# Patient Record
Sex: FEMALE | Race: WHITE | NOT HISPANIC OR LATINO | Employment: OTHER | URBAN - METROPOLITAN AREA
[De-identification: names, ages, dates, MRNs, and addresses within clinical notes are randomized per-mention and may not be internally consistent; named-entity substitution may affect disease eponyms.]

---

## 2017-04-28 PROBLEM — M79.644 FINGER PAIN, RIGHT: Status: ACTIVE | Noted: 2017-04-28

## 2017-04-28 PROBLEM — M72.0 DUPUYTREN'S CONTRACTURE OF RIGHT HAND: Status: ACTIVE | Noted: 2017-04-28

## 2019-06-19 ENCOUNTER — HOSPITAL ENCOUNTER (OUTPATIENT)
Dept: RADIOLOGY | Facility: MEDICAL CENTER | Age: 74
DRG: 455 | End: 2019-06-19
Attending: NEUROLOGICAL SURGERY
Payer: MEDICARE

## 2019-06-19 DIAGNOSIS — Z01.811 PRE-OPERATIVE RESPIRATORY EXAMINATION: ICD-10-CM

## 2019-06-19 DIAGNOSIS — Z01.812 PRE-OPERATIVE LABORATORY EXAMINATION: ICD-10-CM

## 2019-06-19 DIAGNOSIS — R79.1 ABNORMAL COAGULATION PROFILE: ICD-10-CM

## 2019-06-19 DIAGNOSIS — M43.06 SPONDYLOLYSIS, LUMBAR REGION: ICD-10-CM

## 2019-06-19 DIAGNOSIS — Z01.810 PRE-OPERATIVE CARDIOVASCULAR EXAMINATION: ICD-10-CM

## 2019-06-19 DIAGNOSIS — M54.16 LUMBAR RADICULOPATHY: ICD-10-CM

## 2019-06-19 DIAGNOSIS — R82.90 NONSPECIFIC FINDING ON EXAMINATION OF URINE: ICD-10-CM

## 2019-06-19 DIAGNOSIS — R94.31 NONSPECIFIC ABNORMAL ELECTROCARDIOGRAM (ECG) (EKG): ICD-10-CM

## 2019-06-19 DIAGNOSIS — M48.061 SPINAL STENOSIS OF LUMBAR REGION, UNSPECIFIED WHETHER NEUROGENIC CLAUDICATION PRESENT: ICD-10-CM

## 2019-06-19 LAB
ANION GAP SERPL CALC-SCNC: 8 MMOL/L (ref 0–11.9)
APPEARANCE UR: CLEAR
APTT PPP: 25.3 SEC (ref 24.7–36)
BACTERIA #/AREA URNS HPF: NEGATIVE /HPF
BASOPHILS # BLD AUTO: 0.9 % (ref 0–1.8)
BASOPHILS # BLD: 0.06 K/UL (ref 0–0.12)
BILIRUB UR QL STRIP.AUTO: NEGATIVE
BUN SERPL-MCNC: 13 MG/DL (ref 8–22)
CALCIUM SERPL-MCNC: 9.2 MG/DL (ref 8.5–10.5)
CHLORIDE SERPL-SCNC: 103 MMOL/L (ref 96–112)
CO2 SERPL-SCNC: 30 MMOL/L (ref 20–33)
COLOR UR: YELLOW
CREAT SERPL-MCNC: 0.96 MG/DL (ref 0.5–1.4)
EKG IMPRESSION: NORMAL
EOSINOPHIL # BLD AUTO: 0.18 K/UL (ref 0–0.51)
EOSINOPHIL NFR BLD: 2.7 % (ref 0–6.9)
EPI CELLS #/AREA URNS HPF: NEGATIVE /HPF
ERYTHROCYTE [DISTWIDTH] IN BLOOD BY AUTOMATED COUNT: 43.8 FL (ref 35.9–50)
GLUCOSE SERPL-MCNC: 133 MG/DL (ref 65–99)
GLUCOSE UR STRIP.AUTO-MCNC: NEGATIVE MG/DL
HCT VFR BLD AUTO: 40.9 % (ref 37–47)
HGB BLD-MCNC: 13.3 G/DL (ref 12–16)
HYALINE CASTS #/AREA URNS LPF: ABNORMAL /LPF
IMM GRANULOCYTES # BLD AUTO: 0.06 K/UL (ref 0–0.11)
IMM GRANULOCYTES NFR BLD AUTO: 0.9 % (ref 0–0.9)
INR PPP: 0.91 (ref 0.87–1.13)
KETONES UR STRIP.AUTO-MCNC: NEGATIVE MG/DL
LEUKOCYTE ESTERASE UR QL STRIP.AUTO: ABNORMAL
LYMPHOCYTES # BLD AUTO: 2.21 K/UL (ref 1–4.8)
LYMPHOCYTES NFR BLD: 33.4 % (ref 22–41)
MCH RBC QN AUTO: 30.2 PG (ref 27–33)
MCHC RBC AUTO-ENTMCNC: 32.5 G/DL (ref 33.6–35)
MCV RBC AUTO: 92.7 FL (ref 81.4–97.8)
MICRO URNS: ABNORMAL
MONOCYTES # BLD AUTO: 0.41 K/UL (ref 0–0.85)
MONOCYTES NFR BLD AUTO: 6.2 % (ref 0–13.4)
NEUTROPHILS # BLD AUTO: 3.69 K/UL (ref 2–7.15)
NEUTROPHILS NFR BLD: 55.9 % (ref 44–72)
NITRITE UR QL STRIP.AUTO: NEGATIVE
NRBC # BLD AUTO: 0 K/UL
NRBC BLD-RTO: 0 /100 WBC
PH UR STRIP.AUTO: 6 [PH]
PLATELET # BLD AUTO: 220 K/UL (ref 164–446)
PMV BLD AUTO: 10.9 FL (ref 9–12.9)
POTASSIUM SERPL-SCNC: 4.3 MMOL/L (ref 3.6–5.5)
PROT UR QL STRIP: NEGATIVE MG/DL
PROTHROMBIN TIME: 12.4 SEC (ref 12–14.6)
RBC # BLD AUTO: 4.41 M/UL (ref 4.2–5.4)
RBC # URNS HPF: ABNORMAL /HPF
RBC UR QL AUTO: NEGATIVE
SODIUM SERPL-SCNC: 141 MMOL/L (ref 135–145)
SP GR UR STRIP.AUTO: 1.02
UROBILINOGEN UR STRIP.AUTO-MCNC: 0.2 MG/DL
WBC # BLD AUTO: 6.6 K/UL (ref 4.8–10.8)
WBC #/AREA URNS HPF: ABNORMAL /HPF

## 2019-06-19 PROCEDURE — 85730 THROMBOPLASTIN TIME PARTIAL: CPT

## 2019-06-19 PROCEDURE — 81001 URINALYSIS AUTO W/SCOPE: CPT

## 2019-06-19 PROCEDURE — 85025 COMPLETE CBC W/AUTO DIFF WBC: CPT

## 2019-06-19 PROCEDURE — 36415 COLL VENOUS BLD VENIPUNCTURE: CPT

## 2019-06-19 PROCEDURE — 80048 BASIC METABOLIC PNL TOTAL CA: CPT

## 2019-06-19 PROCEDURE — 72110 X-RAY EXAM L-2 SPINE 4/>VWS: CPT

## 2019-06-19 PROCEDURE — 85610 PROTHROMBIN TIME: CPT

## 2019-06-19 PROCEDURE — 71046 X-RAY EXAM CHEST 2 VIEWS: CPT

## 2019-06-19 PROCEDURE — 93005 ELECTROCARDIOGRAM TRACING: CPT

## 2019-06-19 PROCEDURE — 93010 ELECTROCARDIOGRAM REPORT: CPT | Performed by: INTERNAL MEDICINE

## 2019-06-19 RX ORDER — ATORVASTATIN CALCIUM 40 MG/1
40 TABLET, FILM COATED ORAL NIGHTLY
COMMUNITY

## 2019-07-02 ENCOUNTER — APPOINTMENT (OUTPATIENT)
Dept: RADIOLOGY | Facility: MEDICAL CENTER | Age: 74
DRG: 455 | End: 2019-07-02
Attending: NEUROLOGICAL SURGERY
Payer: MEDICARE

## 2019-07-02 ENCOUNTER — HOSPITAL ENCOUNTER (INPATIENT)
Facility: MEDICAL CENTER | Age: 74
LOS: 8 days | DRG: 455 | End: 2019-07-10
Attending: NEUROLOGICAL SURGERY | Admitting: NEUROLOGICAL SURGERY
Payer: MEDICARE

## 2019-07-02 ENCOUNTER — ANESTHESIA (OUTPATIENT)
Dept: SURGERY | Facility: MEDICAL CENTER | Age: 74
DRG: 455 | End: 2019-07-02
Payer: MEDICARE

## 2019-07-02 ENCOUNTER — ANESTHESIA EVENT (OUTPATIENT)
Dept: SURGERY | Facility: MEDICAL CENTER | Age: 74
DRG: 455 | End: 2019-07-02
Payer: MEDICARE

## 2019-07-02 DIAGNOSIS — Z98.1 S/P LUMBAR FUSION: ICD-10-CM

## 2019-07-02 DIAGNOSIS — R26.9 ABNORMAL GAIT: ICD-10-CM

## 2019-07-02 LAB
GLUCOSE BLD-MCNC: 103 MG/DL (ref 65–99)
GLUCOSE BLD-MCNC: 186 MG/DL (ref 65–99)
GLUCOSE BLD-MCNC: 215 MG/DL (ref 65–99)

## 2019-07-02 PROCEDURE — 700112 HCHG RX REV CODE 229: Performed by: PHYSICIAN ASSISTANT

## 2019-07-02 PROCEDURE — 95925 SOMATOSENSORY TESTING: CPT | Performed by: NEUROLOGICAL SURGERY

## 2019-07-02 PROCEDURE — 160009 HCHG ANES TIME/MIN: Performed by: NEUROLOGICAL SURGERY

## 2019-07-02 PROCEDURE — 700105 HCHG RX REV CODE 258: Performed by: NEUROLOGICAL SURGERY

## 2019-07-02 PROCEDURE — 700111 HCHG RX REV CODE 636 W/ 250 OVERRIDE (IP): Performed by: PHYSICIAN ASSISTANT

## 2019-07-02 PROCEDURE — 95861 NEEDLE EMG 2 EXTREMITIES: CPT | Performed by: NEUROLOGICAL SURGERY

## 2019-07-02 PROCEDURE — 700101 HCHG RX REV CODE 250: Performed by: NEUROLOGICAL SURGERY

## 2019-07-02 PROCEDURE — 160042 HCHG SURGERY MINUTES - EA ADDL 1 MIN LEVEL 5: Performed by: NEUROLOGICAL SURGERY

## 2019-07-02 PROCEDURE — 502240 HCHG MISC OR SUPPLY RC 0272: Performed by: NEUROLOGICAL SURGERY

## 2019-07-02 PROCEDURE — 0ST20ZZ RESECTION OF LUMBAR VERTEBRAL DISC, OPEN APPROACH: ICD-10-PCS | Performed by: NEUROLOGICAL SURGERY

## 2019-07-02 PROCEDURE — 0SG10A0 FUSION OF 2 OR MORE LUMBAR VERTEBRAL JOINTS WITH INTERBODY FUSION DEVICE, ANTERIOR APPROACH, ANTERIOR COLUMN, OPEN APPROACH: ICD-10-PCS | Performed by: NEUROLOGICAL SURGERY

## 2019-07-02 PROCEDURE — 700102 HCHG RX REV CODE 250 W/ 637 OVERRIDE(OP): Performed by: PHYSICIAN ASSISTANT

## 2019-07-02 PROCEDURE — 82962 GLUCOSE BLOOD TEST: CPT

## 2019-07-02 PROCEDURE — 160036 HCHG PACU - EA ADDL 30 MINS PHASE I: Performed by: NEUROLOGICAL SURGERY

## 2019-07-02 PROCEDURE — 160048 HCHG OR STATISTICAL LEVEL 1-5: Performed by: NEUROLOGICAL SURGERY

## 2019-07-02 PROCEDURE — L0637 LSO SC R ANT/POS PNL PRE CST: HCPCS

## 2019-07-02 PROCEDURE — 4A11X4G MONITORING OF PERIPHERAL NERVOUS ELECTRICAL ACTIVITY, INTRAOPERATIVE, EXTERNAL APPROACH: ICD-10-PCS | Performed by: NEUROLOGICAL SURGERY

## 2019-07-02 PROCEDURE — 160002 HCHG RECOVERY MINUTES (STAT): Performed by: NEUROLOGICAL SURGERY

## 2019-07-02 PROCEDURE — 770001 HCHG ROOM/CARE - MED/SURG/GYN PRIV*

## 2019-07-02 PROCEDURE — 502000 HCHG MISC OR IMPLANTS RC 0278: Performed by: NEUROLOGICAL SURGERY

## 2019-07-02 PROCEDURE — 72100 X-RAY EXAM L-S SPINE 2/3 VWS: CPT

## 2019-07-02 PROCEDURE — 501838 HCHG SUTURE GENERAL: Performed by: NEUROLOGICAL SURGERY

## 2019-07-02 PROCEDURE — 95937 NEUROMUSCULAR JUNCTION TEST: CPT | Performed by: NEUROLOGICAL SURGERY

## 2019-07-02 PROCEDURE — 700111 HCHG RX REV CODE 636 W/ 250 OVERRIDE (IP): Performed by: ANESTHESIOLOGY

## 2019-07-02 PROCEDURE — 700101 HCHG RX REV CODE 250: Performed by: ANESTHESIOLOGY

## 2019-07-02 PROCEDURE — A9270 NON-COVERED ITEM OR SERVICE: HCPCS | Performed by: ANESTHESIOLOGY

## 2019-07-02 PROCEDURE — 160035 HCHG PACU - 1ST 60 MINS PHASE I: Performed by: NEUROLOGICAL SURGERY

## 2019-07-02 PROCEDURE — 700106 HCHG RX REV CODE 271: Performed by: NEUROLOGICAL SURGERY

## 2019-07-02 PROCEDURE — 95940 IONM IN OPERATNG ROOM 15 MIN: CPT | Performed by: NEUROLOGICAL SURGERY

## 2019-07-02 PROCEDURE — A9270 NON-COVERED ITEM OR SERVICE: HCPCS | Performed by: PHYSICIAN ASSISTANT

## 2019-07-02 PROCEDURE — A4314 CATH W/DRAINAGE 2-WAY LATEX: HCPCS | Performed by: NEUROLOGICAL SURGERY

## 2019-07-02 PROCEDURE — 700101 HCHG RX REV CODE 250: Performed by: PHYSICIAN ASSISTANT

## 2019-07-02 PROCEDURE — 700101 HCHG RX REV CODE 250

## 2019-07-02 PROCEDURE — 500885 HCHG PACK, JACKSON TABLE: Performed by: NEUROLOGICAL SURGERY

## 2019-07-02 PROCEDURE — 700102 HCHG RX REV CODE 250 W/ 637 OVERRIDE(OP): Performed by: ANESTHESIOLOGY

## 2019-07-02 PROCEDURE — 160031 HCHG SURGERY MINUTES - 1ST 30 MINS LEVEL 5: Performed by: NEUROLOGICAL SURGERY

## 2019-07-02 RX ORDER — ENEMA 19; 7 G/133ML; G/133ML
1 ENEMA RECTAL
Status: DISCONTINUED | OUTPATIENT
Start: 2019-07-02 | End: 2019-07-10 | Stop reason: HOSPADM

## 2019-07-02 RX ORDER — HYDROMORPHONE HYDROCHLORIDE 2 MG/ML
INJECTION, SOLUTION INTRAMUSCULAR; INTRAVENOUS; SUBCUTANEOUS PRN
Status: DISCONTINUED | OUTPATIENT
Start: 2019-07-02 | End: 2019-07-02 | Stop reason: SURG

## 2019-07-02 RX ORDER — OXYCODONE HCL 5 MG/5 ML
5 SOLUTION, ORAL ORAL
Status: COMPLETED | OUTPATIENT
Start: 2019-07-02 | End: 2019-07-02

## 2019-07-02 RX ORDER — OXYCODONE HCL 5 MG/5 ML
10 SOLUTION, ORAL ORAL
Status: COMPLETED | OUTPATIENT
Start: 2019-07-02 | End: 2019-07-02

## 2019-07-02 RX ORDER — METHOCARBAMOL 750 MG/1
750 TABLET, FILM COATED ORAL EVERY 8 HOURS PRN
Status: DISCONTINUED | OUTPATIENT
Start: 2019-07-02 | End: 2019-07-05

## 2019-07-02 RX ORDER — ONDANSETRON 2 MG/ML
4 INJECTION INTRAMUSCULAR; INTRAVENOUS EVERY 4 HOURS PRN
Status: DISCONTINUED | OUTPATIENT
Start: 2019-07-02 | End: 2019-07-10 | Stop reason: HOSPADM

## 2019-07-02 RX ORDER — DULOXETIN HYDROCHLORIDE 60 MG/1
60 CAPSULE, DELAYED RELEASE ORAL 2 TIMES DAILY
Status: DISCONTINUED | OUTPATIENT
Start: 2019-07-02 | End: 2019-07-10 | Stop reason: HOSPADM

## 2019-07-02 RX ORDER — BISACODYL 10 MG
10 SUPPOSITORY, RECTAL RECTAL
Status: DISCONTINUED | OUTPATIENT
Start: 2019-07-02 | End: 2019-07-10 | Stop reason: HOSPADM

## 2019-07-02 RX ORDER — HYDROMORPHONE HYDROCHLORIDE 1 MG/ML
.5-1 INJECTION, SOLUTION INTRAMUSCULAR; INTRAVENOUS; SUBCUTANEOUS
Status: DISCONTINUED | OUTPATIENT
Start: 2019-07-02 | End: 2019-07-10 | Stop reason: HOSPADM

## 2019-07-02 RX ORDER — LABETALOL HYDROCHLORIDE 5 MG/ML
5 INJECTION, SOLUTION INTRAVENOUS
Status: DISCONTINUED | OUTPATIENT
Start: 2019-07-02 | End: 2019-07-02 | Stop reason: HOSPADM

## 2019-07-02 RX ORDER — HYDRALAZINE HYDROCHLORIDE 20 MG/ML
10 INJECTION INTRAMUSCULAR; INTRAVENOUS
Status: DISCONTINUED | OUTPATIENT
Start: 2019-07-02 | End: 2019-07-10 | Stop reason: HOSPADM

## 2019-07-02 RX ORDER — SODIUM CHLORIDE, SODIUM LACTATE, POTASSIUM CHLORIDE, AND CALCIUM CHLORIDE .6; .31; .03; .02 G/100ML; G/100ML; G/100ML; G/100ML
IRRIGANT IRRIGATION
Status: DISCONTINUED | OUTPATIENT
Start: 2019-07-02 | End: 2019-07-02 | Stop reason: HOSPADM

## 2019-07-02 RX ORDER — MIDAZOLAM HYDROCHLORIDE 1 MG/ML
1 INJECTION INTRAMUSCULAR; INTRAVENOUS
Status: DISCONTINUED | OUTPATIENT
Start: 2019-07-02 | End: 2019-07-02 | Stop reason: HOSPADM

## 2019-07-02 RX ORDER — DIPHENHYDRAMINE HYDROCHLORIDE 50 MG/ML
12.5 INJECTION INTRAMUSCULAR; INTRAVENOUS
Status: DISCONTINUED | OUTPATIENT
Start: 2019-07-02 | End: 2019-07-02 | Stop reason: HOSPADM

## 2019-07-02 RX ORDER — LABETALOL HYDROCHLORIDE 5 MG/ML
INJECTION, SOLUTION INTRAVENOUS PRN
Status: DISCONTINUED | OUTPATIENT
Start: 2019-07-02 | End: 2019-07-02 | Stop reason: SURG

## 2019-07-02 RX ORDER — ALPRAZOLAM 0.25 MG/1
0.25 TABLET ORAL 2 TIMES DAILY PRN
Status: DISCONTINUED | OUTPATIENT
Start: 2019-07-02 | End: 2019-07-10 | Stop reason: HOSPADM

## 2019-07-02 RX ORDER — HYDROMORPHONE HYDROCHLORIDE 1 MG/ML
0.4 INJECTION, SOLUTION INTRAMUSCULAR; INTRAVENOUS; SUBCUTANEOUS
Status: DISCONTINUED | OUTPATIENT
Start: 2019-07-02 | End: 2019-07-02 | Stop reason: HOSPADM

## 2019-07-02 RX ORDER — OXYCODONE HYDROCHLORIDE 5 MG/1
5 TABLET ORAL
Status: DISCONTINUED | OUTPATIENT
Start: 2019-07-02 | End: 2019-07-10 | Stop reason: HOSPADM

## 2019-07-02 RX ORDER — ONDANSETRON 2 MG/ML
4 INJECTION INTRAMUSCULAR; INTRAVENOUS
Status: DISCONTINUED | OUTPATIENT
Start: 2019-07-02 | End: 2019-07-02 | Stop reason: HOSPADM

## 2019-07-02 RX ORDER — SODIUM CHLORIDE AND POTASSIUM CHLORIDE 150; 900 MG/100ML; MG/100ML
INJECTION, SOLUTION INTRAVENOUS CONTINUOUS
Status: DISCONTINUED | OUTPATIENT
Start: 2019-07-02 | End: 2019-07-03

## 2019-07-02 RX ORDER — LISINOPRIL 20 MG/1
20 TABLET ORAL EVERY EVENING
Status: DISCONTINUED | OUTPATIENT
Start: 2019-07-02 | End: 2019-07-10 | Stop reason: HOSPADM

## 2019-07-02 RX ORDER — AMOXICILLIN 250 MG
1 CAPSULE ORAL
Status: DISCONTINUED | OUTPATIENT
Start: 2019-07-02 | End: 2019-07-10 | Stop reason: HOSPADM

## 2019-07-02 RX ORDER — MEPERIDINE HYDROCHLORIDE 25 MG/ML
6.25 INJECTION INTRAMUSCULAR; INTRAVENOUS; SUBCUTANEOUS
Status: DISCONTINUED | OUTPATIENT
Start: 2019-07-02 | End: 2019-07-02 | Stop reason: HOSPADM

## 2019-07-02 RX ORDER — CEFAZOLIN SODIUM 1 G/3ML
INJECTION, POWDER, FOR SOLUTION INTRAMUSCULAR; INTRAVENOUS PRN
Status: DISCONTINUED | OUTPATIENT
Start: 2019-07-02 | End: 2019-07-02 | Stop reason: SURG

## 2019-07-02 RX ORDER — GABAPENTIN 300 MG/1
300 CAPSULE ORAL 3 TIMES DAILY
Status: DISCONTINUED | OUTPATIENT
Start: 2019-07-02 | End: 2019-07-10 | Stop reason: HOSPADM

## 2019-07-02 RX ORDER — ACETAMINOPHEN 325 MG/1
650 TABLET ORAL EVERY 6 HOURS
Status: DISPENSED | OUTPATIENT
Start: 2019-07-02 | End: 2019-07-07

## 2019-07-02 RX ORDER — MAGNESIUM SULFATE HEPTAHYDRATE 40 MG/ML
INJECTION, SOLUTION INTRAVENOUS PRN
Status: DISCONTINUED | OUTPATIENT
Start: 2019-07-02 | End: 2019-07-02 | Stop reason: SURG

## 2019-07-02 RX ORDER — OXYCODONE HYDROCHLORIDE 10 MG/1
10 TABLET ORAL
Status: DISCONTINUED | OUTPATIENT
Start: 2019-07-02 | End: 2019-07-10 | Stop reason: HOSPADM

## 2019-07-02 RX ORDER — ACETAMINOPHEN 500 MG
1000 TABLET ORAL ONCE
Status: COMPLETED | OUTPATIENT
Start: 2019-07-02 | End: 2019-07-02

## 2019-07-02 RX ORDER — SODIUM CHLORIDE, SODIUM LACTATE, POTASSIUM CHLORIDE, CALCIUM CHLORIDE 600; 310; 30; 20 MG/100ML; MG/100ML; MG/100ML; MG/100ML
INJECTION, SOLUTION INTRAVENOUS CONTINUOUS
Status: ACTIVE | OUTPATIENT
Start: 2019-07-02 | End: 2019-07-02

## 2019-07-02 RX ORDER — BACITRACIN 50000 [IU]/1
INJECTION, POWDER, FOR SOLUTION INTRAMUSCULAR
Status: DISCONTINUED | OUTPATIENT
Start: 2019-07-02 | End: 2019-07-02 | Stop reason: HOSPADM

## 2019-07-02 RX ORDER — DEXAMETHASONE SODIUM PHOSPHATE 4 MG/ML
INJECTION, SOLUTION INTRA-ARTICULAR; INTRALESIONAL; INTRAMUSCULAR; INTRAVENOUS; SOFT TISSUE PRN
Status: DISCONTINUED | OUTPATIENT
Start: 2019-07-02 | End: 2019-07-02 | Stop reason: SURG

## 2019-07-02 RX ORDER — POLYETHYLENE GLYCOL 3350 17 G/17G
1 POWDER, FOR SOLUTION ORAL 2 TIMES DAILY PRN
Status: DISCONTINUED | OUTPATIENT
Start: 2019-07-02 | End: 2019-07-10 | Stop reason: HOSPADM

## 2019-07-02 RX ORDER — LIDOCAINE HYDROCHLORIDE 10 MG/ML
INJECTION, SOLUTION INFILTRATION; PERINEURAL
Status: COMPLETED
Start: 2019-07-02 | End: 2019-07-02

## 2019-07-02 RX ORDER — DIPHENHYDRAMINE HCL 25 MG
25 TABLET ORAL EVERY 6 HOURS PRN
Status: DISCONTINUED | OUTPATIENT
Start: 2019-07-02 | End: 2019-07-10 | Stop reason: HOSPADM

## 2019-07-02 RX ORDER — ATORVASTATIN CALCIUM 40 MG/1
40 TABLET, FILM COATED ORAL NIGHTLY
Status: DISCONTINUED | OUTPATIENT
Start: 2019-07-02 | End: 2019-07-10 | Stop reason: HOSPADM

## 2019-07-02 RX ORDER — BUPIVACAINE HYDROCHLORIDE AND EPINEPHRINE 5; 5 MG/ML; UG/ML
INJECTION, SOLUTION EPIDURAL; INTRACAUDAL; PERINEURAL
Status: DISCONTINUED | OUTPATIENT
Start: 2019-07-02 | End: 2019-07-02 | Stop reason: HOSPADM

## 2019-07-02 RX ORDER — CELECOXIB 200 MG/1
200 CAPSULE ORAL ONCE
Status: COMPLETED | OUTPATIENT
Start: 2019-07-02 | End: 2019-07-02

## 2019-07-02 RX ORDER — LABETALOL HYDROCHLORIDE 5 MG/ML
INJECTION, SOLUTION INTRAVENOUS
Status: DISCONTINUED | OUTPATIENT
Start: 2019-07-02 | End: 2019-07-02

## 2019-07-02 RX ORDER — DOCUSATE SODIUM 100 MG/1
100 CAPSULE, LIQUID FILLED ORAL 2 TIMES DAILY
Status: DISCONTINUED | OUTPATIENT
Start: 2019-07-02 | End: 2019-07-10 | Stop reason: HOSPADM

## 2019-07-02 RX ORDER — HYDROMORPHONE HYDROCHLORIDE 1 MG/ML
0.2 INJECTION, SOLUTION INTRAMUSCULAR; INTRAVENOUS; SUBCUTANEOUS
Status: DISCONTINUED | OUTPATIENT
Start: 2019-07-02 | End: 2019-07-02 | Stop reason: HOSPADM

## 2019-07-02 RX ORDER — TRAZODONE HYDROCHLORIDE 100 MG/1
100 TABLET ORAL NIGHTLY
Status: DISCONTINUED | OUTPATIENT
Start: 2019-07-02 | End: 2019-07-10 | Stop reason: HOSPADM

## 2019-07-02 RX ORDER — SODIUM CHLORIDE, SODIUM LACTATE, POTASSIUM CHLORIDE, CALCIUM CHLORIDE 600; 310; 30; 20 MG/100ML; MG/100ML; MG/100ML; MG/100ML
INJECTION, SOLUTION INTRAVENOUS CONTINUOUS
Status: DISCONTINUED | OUTPATIENT
Start: 2019-07-02 | End: 2019-07-02 | Stop reason: HOSPADM

## 2019-07-02 RX ORDER — HALOPERIDOL 5 MG/ML
1 INJECTION INTRAMUSCULAR
Status: DISCONTINUED | OUTPATIENT
Start: 2019-07-02 | End: 2019-07-02 | Stop reason: HOSPADM

## 2019-07-02 RX ORDER — HYDROMORPHONE HYDROCHLORIDE 1 MG/ML
0.1 INJECTION, SOLUTION INTRAMUSCULAR; INTRAVENOUS; SUBCUTANEOUS
Status: DISCONTINUED | OUTPATIENT
Start: 2019-07-02 | End: 2019-07-02 | Stop reason: HOSPADM

## 2019-07-02 RX ORDER — HYDRALAZINE HYDROCHLORIDE 20 MG/ML
5 INJECTION INTRAMUSCULAR; INTRAVENOUS
Status: DISCONTINUED | OUTPATIENT
Start: 2019-07-02 | End: 2019-07-02 | Stop reason: HOSPADM

## 2019-07-02 RX ORDER — DIPHENHYDRAMINE HYDROCHLORIDE 50 MG/ML
25 INJECTION INTRAMUSCULAR; INTRAVENOUS EVERY 6 HOURS PRN
Status: DISCONTINUED | OUTPATIENT
Start: 2019-07-02 | End: 2019-07-10 | Stop reason: HOSPADM

## 2019-07-02 RX ORDER — LEVOTHYROXINE SODIUM 0.07 MG/1
75 TABLET ORAL
Status: DISCONTINUED | OUTPATIENT
Start: 2019-07-02 | End: 2019-07-10 | Stop reason: HOSPADM

## 2019-07-02 RX ORDER — LABETALOL HYDROCHLORIDE 5 MG/ML
10 INJECTION, SOLUTION INTRAVENOUS
Status: DISCONTINUED | OUTPATIENT
Start: 2019-07-02 | End: 2019-07-10 | Stop reason: HOSPADM

## 2019-07-02 RX ORDER — CALCIUM CARBONATE 500 MG/1
500 TABLET, CHEWABLE ORAL 2 TIMES DAILY
Status: DISCONTINUED | OUTPATIENT
Start: 2019-07-02 | End: 2019-07-10 | Stop reason: HOSPADM

## 2019-07-02 RX ORDER — AMOXICILLIN 250 MG
1 CAPSULE ORAL NIGHTLY
Status: DISCONTINUED | OUTPATIENT
Start: 2019-07-02 | End: 2019-07-10 | Stop reason: HOSPADM

## 2019-07-02 RX ORDER — ONDANSETRON 4 MG/1
4 TABLET, ORALLY DISINTEGRATING ORAL EVERY 4 HOURS PRN
Status: DISCONTINUED | OUTPATIENT
Start: 2019-07-02 | End: 2019-07-10 | Stop reason: HOSPADM

## 2019-07-02 RX ORDER — ONDANSETRON 2 MG/ML
INJECTION INTRAMUSCULAR; INTRAVENOUS PRN
Status: DISCONTINUED | OUTPATIENT
Start: 2019-07-02 | End: 2019-07-02 | Stop reason: SURG

## 2019-07-02 RX ORDER — CEFAZOLIN SODIUM 2 G/100ML
2 INJECTION, SOLUTION INTRAVENOUS EVERY 8 HOURS
Status: COMPLETED | OUTPATIENT
Start: 2019-07-02 | End: 2019-07-03

## 2019-07-02 RX ORDER — GABAPENTIN 300 MG/1
300 CAPSULE ORAL ONCE
Status: COMPLETED | OUTPATIENT
Start: 2019-07-02 | End: 2019-07-02

## 2019-07-02 RX ORDER — TRAZODONE HYDROCHLORIDE 100 MG/1
100 TABLET ORAL NIGHTLY
COMMUNITY

## 2019-07-02 RX ADMIN — DOCUSATE SODIUM 100 MG: 100 CAPSULE, LIQUID FILLED ORAL at 18:09

## 2019-07-02 RX ADMIN — PROPOFOL 50 MG: 10 INJECTION, EMULSION INTRAVENOUS at 13:48

## 2019-07-02 RX ADMIN — CELECOXIB 200 MG: 200 CAPSULE ORAL at 10:52

## 2019-07-02 RX ADMIN — ONDANSETRON 4 MG: 2 INJECTION INTRAMUSCULAR; INTRAVENOUS at 14:14

## 2019-07-02 RX ADMIN — PROPOFOL 200 MG: 10 INJECTION, EMULSION INTRAVENOUS at 12:07

## 2019-07-02 RX ADMIN — SUFENTANIL CITRATE 50 MCG: 50 INJECTION EPIDURAL; INTRAVENOUS at 12:16

## 2019-07-02 RX ADMIN — ROCURONIUM BROMIDE 20 MG: 10 INJECTION, SOLUTION INTRAVENOUS at 12:16

## 2019-07-02 RX ADMIN — ANTACID TABLETS 500 MG: 500 TABLET, CHEWABLE ORAL at 18:08

## 2019-07-02 RX ADMIN — OXYCODONE HYDROCHLORIDE 10 MG: 10 TABLET ORAL at 19:24

## 2019-07-02 RX ADMIN — ACETAMINOPHEN 650 MG: 325 TABLET, FILM COATED ORAL at 18:08

## 2019-07-02 RX ADMIN — POTASSIUM CHLORIDE AND SODIUM CHLORIDE: 900; 150 INJECTION, SOLUTION INTRAVENOUS at 17:55

## 2019-07-02 RX ADMIN — SENNOSIDES, DOCUSATE SODIUM 1 TABLET: 50; 8.6 TABLET, FILM COATED ORAL at 20:28

## 2019-07-02 RX ADMIN — Medication 0.5 ML: at 10:57

## 2019-07-02 RX ADMIN — PROPOFOL 200 MG: 10 INJECTION, EMULSION INTRAVENOUS at 12:16

## 2019-07-02 RX ADMIN — METFORMIN HYDROCHLORIDE 500 MG: 500 TABLET, FILM COATED ORAL at 18:09

## 2019-07-02 RX ADMIN — LIDOCAINE HYDROCHLORIDE 50 MG: 20 INJECTION, SOLUTION INTRAVENOUS at 12:07

## 2019-07-02 RX ADMIN — OXYCODONE HYDROCHLORIDE 10 MG: 5 SOLUTION ORAL at 15:31

## 2019-07-02 RX ADMIN — GABAPENTIN 300 MG: 300 CAPSULE ORAL at 18:08

## 2019-07-02 RX ADMIN — HYDROMORPHONE HYDROCHLORIDE 0.4 MG: 1 INJECTION, SOLUTION INTRAMUSCULAR; INTRAVENOUS; SUBCUTANEOUS at 16:25

## 2019-07-02 RX ADMIN — CEFAZOLIN SODIUM 2 G: 2 INJECTION, SOLUTION INTRAVENOUS at 18:09

## 2019-07-02 RX ADMIN — MINERAL OIL AND PETROLATUM 1 APPLICATION: 150; 830 OINTMENT OPHTHALMIC at 20:39

## 2019-07-02 RX ADMIN — LEVOTHYROXINE SODIUM 75 MCG: 75 TABLET ORAL at 18:09

## 2019-07-02 RX ADMIN — MAGNESIUM SULFATE IN WATER 2 G: 40 INJECTION, SOLUTION INTRAVENOUS at 13:48

## 2019-07-02 RX ADMIN — PROPOFOL 120 MCG/KG/MIN: 10 INJECTION, EMULSION INTRAVENOUS at 12:07

## 2019-07-02 RX ADMIN — GABAPENTIN 300 MG: 300 CAPSULE ORAL at 10:52

## 2019-07-02 RX ADMIN — FENTANYL CITRATE 50 MCG: 50 INJECTION INTRAMUSCULAR; INTRAVENOUS at 15:02

## 2019-07-02 RX ADMIN — DULOXETINE HYDROCHLORIDE 60 MG: 60 CAPSULE, DELAYED RELEASE ORAL at 18:09

## 2019-07-02 RX ADMIN — HYDROMORPHONE HYDROCHLORIDE 0.4 MG: 1 INJECTION, SOLUTION INTRAMUSCULAR; INTRAVENOUS; SUBCUTANEOUS at 15:44

## 2019-07-02 RX ADMIN — HYDROMORPHONE HYDROCHLORIDE 0.4 MG: 2 INJECTION, SOLUTION INTRAMUSCULAR; INTRAVENOUS; SUBCUTANEOUS at 13:46

## 2019-07-02 RX ADMIN — LIDOCAINE HYDROCHLORIDE 0.5 ML: 10 INJECTION, SOLUTION EPIDURAL; INFILTRATION; INTRACAUDAL; PERINEURAL at 10:57

## 2019-07-02 RX ADMIN — LABETALOL HYDROCHLORIDE 5 MG: 5 INJECTION, SOLUTION INTRAVENOUS at 13:52

## 2019-07-02 RX ADMIN — CEFAZOLIN 1 G: 330 INJECTION, POWDER, FOR SOLUTION INTRAMUSCULAR; INTRAVENOUS at 13:04

## 2019-07-02 RX ADMIN — DEXAMETHASONE SODIUM PHOSPHATE 8 MG: 4 INJECTION, SOLUTION INTRA-ARTICULAR; INTRALESIONAL; INTRAMUSCULAR; INTRAVENOUS; SOFT TISSUE at 12:16

## 2019-07-02 RX ADMIN — HYDROMORPHONE HYDROCHLORIDE 0.4 MG: 2 INJECTION, SOLUTION INTRAMUSCULAR; INTRAVENOUS; SUBCUTANEOUS at 13:22

## 2019-07-02 RX ADMIN — FENTANYL CITRATE 50 MCG: 50 INJECTION INTRAMUSCULAR; INTRAVENOUS at 15:32

## 2019-07-02 RX ADMIN — SUGAMMADEX 200 MG: 100 INJECTION, SOLUTION INTRAVENOUS at 14:36

## 2019-07-02 RX ADMIN — SODIUM CHLORIDE, POTASSIUM CHLORIDE, SODIUM LACTATE AND CALCIUM CHLORIDE: 600; 310; 30; 20 INJECTION, SOLUTION INTRAVENOUS at 10:57

## 2019-07-02 RX ADMIN — MIDAZOLAM HYDROCHLORIDE 2 MG: 1 INJECTION, SOLUTION INTRAMUSCULAR; INTRAVENOUS at 12:05

## 2019-07-02 RX ADMIN — SODIUM CHLORIDE, POTASSIUM CHLORIDE, SODIUM LACTATE AND CALCIUM CHLORIDE: 600; 310; 30; 20 INJECTION, SOLUTION INTRAVENOUS at 12:04

## 2019-07-02 RX ADMIN — CEFAZOLIN 2 G: 330 INJECTION, POWDER, FOR SOLUTION INTRAMUSCULAR; INTRAVENOUS at 12:17

## 2019-07-02 RX ADMIN — INSULIN HUMAN 3 UNITS: 100 INJECTION, SOLUTION PARENTERAL at 20:36

## 2019-07-02 RX ADMIN — TRAZODONE HYDROCHLORIDE 100 MG: 100 TABLET ORAL at 20:28

## 2019-07-02 RX ADMIN — MAGNESIUM HYDROXIDE 30 ML: 400 SUSPENSION ORAL at 19:00

## 2019-07-02 RX ADMIN — ACETAMINOPHEN 1000 MG: 500 TABLET ORAL at 10:52

## 2019-07-02 RX ADMIN — LISINOPRIL 20 MG: 20 TABLET ORAL at 18:08

## 2019-07-02 RX ADMIN — HYDROMORPHONE HYDROCHLORIDE 0.2 MG: 1 INJECTION, SOLUTION INTRAMUSCULAR; INTRAVENOUS; SUBCUTANEOUS at 16:33

## 2019-07-02 RX ADMIN — ATORVASTATIN CALCIUM 40 MG: 40 TABLET, FILM COATED ORAL at 18:08

## 2019-07-02 RX ADMIN — MAGNESIUM SULFATE IN WATER 2 G: 40 INJECTION, SOLUTION INTRAVENOUS at 13:58

## 2019-07-02 ASSESSMENT — COGNITIVE AND FUNCTIONAL STATUS - GENERAL
DAILY ACTIVITIY SCORE: 16
WALKING IN HOSPITAL ROOM: A LITTLE
TOILETING: A LITTLE
MOVING TO AND FROM BED TO CHAIR: A LITTLE
DRESSING REGULAR UPPER BODY CLOTHING: A LOT
STANDING UP FROM CHAIR USING ARMS: A LITTLE
EATING MEALS: A LITTLE
SUGGESTED CMS G CODE MODIFIER MOBILITY: CK
MOVING FROM LYING ON BACK TO SITTING ON SIDE OF FLAT BED: A LITTLE
HELP NEEDED FOR BATHING: A LITTLE
PERSONAL GROOMING: A LITTLE
TURNING FROM BACK TO SIDE WHILE IN FLAT BAD: A LITTLE
DRESSING REGULAR LOWER BODY CLOTHING: A LOT
SUGGESTED CMS G CODE MODIFIER DAILY ACTIVITY: CK
MOBILITY SCORE: 17
CLIMB 3 TO 5 STEPS WITH RAILING: A LOT

## 2019-07-02 ASSESSMENT — PATIENT HEALTH QUESTIONNAIRE - PHQ9
SUM OF ALL RESPONSES TO PHQ9 QUESTIONS 1 AND 2: 0
1. LITTLE INTEREST OR PLEASURE IN DOING THINGS: NOT AT ALL
2. FEELING DOWN, DEPRESSED, IRRITABLE, OR HOPELESS: NOT AT ALL

## 2019-07-02 ASSESSMENT — LIFESTYLE VARIABLES
ALCOHOL_USE: NO
EVER_SMOKED: NEVER

## 2019-07-02 ASSESSMENT — PAIN SCALES - GENERAL: PAIN_LEVEL: 3

## 2019-07-02 NOTE — ANESTHESIA POSTPROCEDURE EVALUATION
Patient: WILTON Chowdary    Procedure Summary     Date:  07/02/19 Room / Location:  [facility] 07 / SURGERY [facility]    Anesthesia Start:  1204 Anesthesia Stop:  1447    Procedure:  FUSION, SPINE, XLIF - STAGE #1 L3-5 (Left Flank) Diagnosis:       Lumbar stenosis      Spondylosis      Lumbar radiculopathy      (lumbar stenosis , spondylosis radiculopathy )    Surgeon:  Nestor Urbina M.D. Responsible Provider:  Huey Cook M.D.    Anesthesia Type:  general ASA Status:  2          Final Anesthesia Type: general  Last vitals  BP   NIBP: 121/62    Temp   36.2 °C (97.2 °F)    Pulse   Pulse: 94   Resp   18    SpO2   94 %      Anesthesia Post Evaluation    Patient location during evaluation: PACU  Patient participation: complete - patient participated  Level of consciousness: awake and alert  Pain score: 3    Airway patency: patent  Anesthetic complications: no  Cardiovascular status: hemodynamically stable  Respiratory status: acceptable  Hydration status: euvolemic    PONV: none           Nurse Pain Score: 5 (NPRS)

## 2019-07-02 NOTE — OR SURGEON
Immediate Post OP Note    PreOp Diagnosis: L3-4, 4-5 DDD, adjacent segment degeneration, stenosis, lumbar radiculopathy    PostOp Diagnosis: Same    Procedure(s):  FUSION, SPINE, XLIF, LEFT - STAGE #1 L3-5 - Wound Class: Clean    Surgeon(s):  Nestor Urbina M.D.    Anesthesiologist/Type of Anesthesia:  Anesthesiologist: Huey Cook M.D./General    Surgical Staff:  Assistant: Indigo Ramirez P.A.-C.  Circulator: Naomi Quinones R.N.  Relief Circulator: Aamir Melgar R.N.  Scrub Person: Amaury Wong  Radiology Technologist: Vivian Love    Specimens removed if any:  * No specimens in log *    Estimated Blood Loss: 50ccs    Findings: See op report    Complications: None        7/2/2019 2:40 PM Indigo Ramirez P.A.-C.

## 2019-07-02 NOTE — ANESTHESIA PROCEDURE NOTES
Airway  Date/Time: 7/2/2019 12:20 PM  Performed by: HUGO PETERSON  Authorized by: HUGO PETERSON     Location:  OR  Urgency:  Elective  Difficult Airway: No    Indications for Airway Management:  Anesthesia  Spontaneous Ventilation: absent    Sedation Level:  Deep  Preoxygenated: Yes    Patient Position:  Sniffing  MILS Maintained Throughout: No    Mask Difficulty Assessment:  2 - vent by mask + OA or adjuvant +/- NMBA  Final Airway Type:  Endotracheal airway  Final Endotracheal Airway:  ETT  Cuffed: Yes    Technique Used for Successful ETT Placement:  Direct laryngoscopy  Insertion Site:  Oral  Blade Type:  Dominik  Laryngoscope Blade/Videolaryngoscope Blade Size:  3  ETT Size (mm):  7.5  Measured from:  Lips  ETT to Lips (cm):  22  Placement Verified by: auscultation and capnometry    Cormack-Lehane Classification:  Grade I - full view of glottis  Number of Attempts at Approach:  1

## 2019-07-02 NOTE — ANESTHESIA PREPROCEDURE EVALUATION
72 yo female for spine L3-5 XLIF  HTN, GERD  On Metormin, Gabapentin, oxycodone, h/o asthma,    Relevant Problems   (+) Essential hypertension       Physical Exam    Airway   Mallampati: II  TM distance: >3 FB  Neck ROM: full       Cardiovascular - normal exam  Rhythm: regular  Rate: normal  (-) murmur     Dental   (+) upper dentures      Very poor dentition   Pulmonary - normal exam  Breath sounds clear to auscultation     Abdominal   Abdomen: soft  Bowel sounds: normal   Neurological - abnormal exam                 Anesthesia Plan    ASA 2       Plan - general       Airway plan will be ETT        Induction: intravenous    Postoperative Plan: Postoperative administration of opioids is intended.    Pertinent diagnostic labs and testing reviewed    Informed Consent:    Anesthetic plan and risks discussed with patient.    Use of blood products discussed with: patient whom consented to blood products.          Ambulatory

## 2019-07-02 NOTE — ANESTHESIA PROCEDURE NOTES
Peripheral IV  Date/Time: 7/2/2019 12:10 PM  Performed by: HUGO PETERSON  Authorized by: HUGO PETERSON     Size:  18 G  Laterality:  Left (forearm)  Local Anesthetic:  None  Site Prep:  Alcohol  Technique:  Direct puncture  Attempts:  1

## 2019-07-02 NOTE — ANESTHESIA QCDR
2019 [facility] Clinical Data Registry (for Quality Improvement)     Postoperative nausea/vomiting risk protocol (Adult = 18 yrs and Pediatric 3-17 yrs)- (430 and 463)  General inhalation anesthetic (NOT TIVA) with PONV risk factors: Yes  Provision of anti-emetic therapy with at least 2 different classes of agents: Yes   Patient DID NOT receive anti-emetic therapy and reason is documented in Medical Record:  N/A    Multimodal Pain Management- (AQI59)  Patient undergoing Elective Surgery (i.e. Outpatient, or ASC, or Prescheduled Surgery prior to Hospital Admission): Yes  Use of Multimodal Pain Management, two or more drugs and/or interventions, NOT including systemic opioids: Yes   Exception: Documented allergy to multiple classes of analgesics:  N/A    PACU assessment of acute postoperative pain prior to Anesthesia Care End- Applies to Patients Age = 18- (ABG7)  Initial PACU pain score is which of the following: < 7/10  Patient unable to report pain score: N/A    Post-anesthetic transfer of care checklist/protocol to PACU/ICU- (426 and 427)  Upon conclusion of case, patient transferred to which of the following locations: PACU/Non-ICU  Use of transfer checklist/protocol: Yes  Exclusion: Service Performed in Patient Hospital Room (and thus did not require transfer): N/A    PACU Reintubation- (AQI31)  General anesthesia requiring endotracheal intubation (ETT) along with subsequent extubation in OR or PACU: Yes  Required reintubation in the PACU: No   Extubation was a planned trial documented in the medical record prior to removal of the original airway device:  N/A    Unplanned admission to ICU related to anesthesia service up through end of PACU care- (MD51)  Unplanned admission to ICU (not initially anticipated at anesthesia start time): No

## 2019-07-02 NOTE — ANESTHESIA TIME REPORT
Anesthesia Start and Stop Event Times     Date Time Event    7/2/2019 1204 Anesthesia Start     1447 Anesthesia Stop        Responsible Staff  07/02/19    Name Role Begin End    Huey Cook M.D. Anesth 1204 1447        Preop Diagnosis (Free Text):  Pre-op Diagnosis     LUMBAR STENOSIS, SPONDYLOSIS, RADICULOPATHY        Preop Diagnosis (Codes):  Diagnosis Information     Diagnosis Code(s): Lumbar stenosis [M48.061]     Spondylosis [M47.9]     Lumbar radiculopathy [M54.16]        Post op Diagnosis  Lumbar stenosis      Premium Reason  Non-Premium    Comments:

## 2019-07-03 ENCOUNTER — APPOINTMENT (OUTPATIENT)
Dept: RADIOLOGY | Facility: MEDICAL CENTER | Age: 74
DRG: 455 | End: 2019-07-03
Attending: PHYSICIAN ASSISTANT
Payer: MEDICARE

## 2019-07-03 LAB
ANION GAP SERPL CALC-SCNC: 11 MMOL/L (ref 0–11.9)
BUN SERPL-MCNC: 21 MG/DL (ref 8–22)
CALCIUM SERPL-MCNC: 8.4 MG/DL (ref 8.5–10.5)
CHLORIDE SERPL-SCNC: 99 MMOL/L (ref 96–112)
CO2 SERPL-SCNC: 26 MMOL/L (ref 20–33)
CREAT SERPL-MCNC: 1.21 MG/DL (ref 0.5–1.4)
ERYTHROCYTE [DISTWIDTH] IN BLOOD BY AUTOMATED COUNT: 47.1 FL (ref 35.9–50)
GLUCOSE BLD-MCNC: 119 MG/DL (ref 65–99)
GLUCOSE BLD-MCNC: 126 MG/DL (ref 65–99)
GLUCOSE SERPL-MCNC: 153 MG/DL (ref 65–99)
HCT VFR BLD AUTO: 37.1 % (ref 37–47)
HGB BLD-MCNC: 11.5 G/DL (ref 12–16)
MCH RBC QN AUTO: 29.9 PG (ref 27–33)
MCHC RBC AUTO-ENTMCNC: 31 G/DL (ref 33.6–35)
MCV RBC AUTO: 96.6 FL (ref 81.4–97.8)
PLATELET # BLD AUTO: 194 K/UL (ref 164–446)
PMV BLD AUTO: 11 FL (ref 9–12.9)
POTASSIUM SERPL-SCNC: 5.1 MMOL/L (ref 3.6–5.5)
RBC # BLD AUTO: 3.84 M/UL (ref 4.2–5.4)
SODIUM SERPL-SCNC: 136 MMOL/L (ref 135–145)
WBC # BLD AUTO: 11.3 K/UL (ref 4.8–10.8)

## 2019-07-03 PROCEDURE — A9270 NON-COVERED ITEM OR SERVICE: HCPCS | Performed by: PHYSICIAN ASSISTANT

## 2019-07-03 PROCEDURE — 80048 BASIC METABOLIC PNL TOTAL CA: CPT

## 2019-07-03 PROCEDURE — 700101 HCHG RX REV CODE 250: Performed by: PHYSICIAN ASSISTANT

## 2019-07-03 PROCEDURE — 82962 GLUCOSE BLOOD TEST: CPT | Mod: 91

## 2019-07-03 PROCEDURE — 72131 CT LUMBAR SPINE W/O DYE: CPT

## 2019-07-03 PROCEDURE — 36415 COLL VENOUS BLD VENIPUNCTURE: CPT

## 2019-07-03 PROCEDURE — 700105 HCHG RX REV CODE 258: Performed by: PHYSICIAN ASSISTANT

## 2019-07-03 PROCEDURE — 700102 HCHG RX REV CODE 250 W/ 637 OVERRIDE(OP): Performed by: PHYSICIAN ASSISTANT

## 2019-07-03 PROCEDURE — 700112 HCHG RX REV CODE 229: Performed by: PHYSICIAN ASSISTANT

## 2019-07-03 PROCEDURE — 700111 HCHG RX REV CODE 636 W/ 250 OVERRIDE (IP): Performed by: PHYSICIAN ASSISTANT

## 2019-07-03 PROCEDURE — 770001 HCHG ROOM/CARE - MED/SURG/GYN PRIV*

## 2019-07-03 PROCEDURE — 85027 COMPLETE CBC AUTOMATED: CPT

## 2019-07-03 PROCEDURE — 97161 PT EVAL LOW COMPLEX 20 MIN: CPT

## 2019-07-03 RX ORDER — SODIUM CHLORIDE 9 MG/ML
INJECTION, SOLUTION INTRAVENOUS CONTINUOUS
Status: DISCONTINUED | OUTPATIENT
Start: 2019-07-03 | End: 2019-07-10 | Stop reason: HOSPADM

## 2019-07-03 RX ORDER — KETOROLAC TROMETHAMINE 5 MG/ML
1 SOLUTION OPHTHALMIC 4 TIMES DAILY
Status: DISCONTINUED | OUTPATIENT
Start: 2019-07-03 | End: 2019-07-10 | Stop reason: HOSPADM

## 2019-07-03 RX ADMIN — CEFAZOLIN SODIUM 2 G: 2 INJECTION, SOLUTION INTRAVENOUS at 00:17

## 2019-07-03 RX ADMIN — GABAPENTIN 300 MG: 300 CAPSULE ORAL at 16:36

## 2019-07-03 RX ADMIN — METFORMIN HYDROCHLORIDE 500 MG: 500 TABLET, FILM COATED ORAL at 05:09

## 2019-07-03 RX ADMIN — DULOXETINE HYDROCHLORIDE 60 MG: 60 CAPSULE, DELAYED RELEASE ORAL at 05:09

## 2019-07-03 RX ADMIN — BISACODYL 10 MG: 10 SUPPOSITORY RECTAL at 18:00

## 2019-07-03 RX ADMIN — OXYCODONE HYDROCHLORIDE 10 MG: 10 TABLET ORAL at 15:07

## 2019-07-03 RX ADMIN — TRAZODONE HYDROCHLORIDE 100 MG: 100 TABLET ORAL at 20:44

## 2019-07-03 RX ADMIN — MINERAL OIL AND PETROLATUM 1 APPLICATION: 150; 830 OINTMENT OPHTHALMIC at 11:53

## 2019-07-03 RX ADMIN — METFORMIN HYDROCHLORIDE 500 MG: 500 TABLET, FILM COATED ORAL at 16:36

## 2019-07-03 RX ADMIN — ACETAMINOPHEN 650 MG: 325 TABLET, FILM COATED ORAL at 00:18

## 2019-07-03 RX ADMIN — LISINOPRIL 20 MG: 20 TABLET ORAL at 16:36

## 2019-07-03 RX ADMIN — DULOXETINE HYDROCHLORIDE 60 MG: 60 CAPSULE, DELAYED RELEASE ORAL at 16:36

## 2019-07-03 RX ADMIN — ACETAMINOPHEN 650 MG: 325 TABLET, FILM COATED ORAL at 16:36

## 2019-07-03 RX ADMIN — ANTACID TABLETS 500 MG: 500 TABLET, CHEWABLE ORAL at 05:08

## 2019-07-03 RX ADMIN — LEVOTHYROXINE SODIUM 75 MCG: 75 TABLET ORAL at 05:09

## 2019-07-03 RX ADMIN — GABAPENTIN 300 MG: 300 CAPSULE ORAL at 05:09

## 2019-07-03 RX ADMIN — SODIUM CHLORIDE: 9 INJECTION, SOLUTION INTRAVENOUS at 17:51

## 2019-07-03 RX ADMIN — ATORVASTATIN CALCIUM 40 MG: 40 TABLET, FILM COATED ORAL at 16:36

## 2019-07-03 RX ADMIN — METHOCARBAMOL TABLETS 750 MG: 750 TABLET, COATED ORAL at 16:42

## 2019-07-03 RX ADMIN — MINERAL OIL AND PETROLATUM 1 APPLICATION: 150; 830 OINTMENT OPHTHALMIC at 05:19

## 2019-07-03 RX ADMIN — ACETAMINOPHEN 650 MG: 325 TABLET, FILM COATED ORAL at 05:09

## 2019-07-03 RX ADMIN — KETOROLAC TROMETHAMINE 1 DROP: 5 SOLUTION/ DROPS OPHTHALMIC at 13:30

## 2019-07-03 RX ADMIN — KETOROLAC TROMETHAMINE 1 DROP: 5 SOLUTION/ DROPS OPHTHALMIC at 17:00

## 2019-07-03 RX ADMIN — DOCUSATE SODIUM 100 MG: 100 CAPSULE, LIQUID FILLED ORAL at 05:09

## 2019-07-03 RX ADMIN — OXYCODONE HYDROCHLORIDE 5 MG: 5 TABLET ORAL at 05:10

## 2019-07-03 RX ADMIN — ACETAMINOPHEN 650 MG: 325 TABLET, FILM COATED ORAL at 11:49

## 2019-07-03 RX ADMIN — MINERAL OIL AND PETROLATUM 1 APPLICATION: 150; 830 OINTMENT OPHTHALMIC at 08:15

## 2019-07-03 RX ADMIN — POTASSIUM CHLORIDE AND SODIUM CHLORIDE: 900; 150 INJECTION, SOLUTION INTRAVENOUS at 05:18

## 2019-07-03 RX ADMIN — OXYCODONE HYDROCHLORIDE 10 MG: 10 TABLET ORAL at 18:39

## 2019-07-03 RX ADMIN — OXYCODONE HYDROCHLORIDE 10 MG: 10 TABLET ORAL at 08:24

## 2019-07-03 RX ADMIN — METHOCARBAMOL TABLETS 750 MG: 750 TABLET, COATED ORAL at 08:24

## 2019-07-03 RX ADMIN — MINERAL OIL AND PETROLATUM 1 APPLICATION: 150; 830 OINTMENT OPHTHALMIC at 14:00

## 2019-07-03 RX ADMIN — GABAPENTIN 300 MG: 300 CAPSULE ORAL at 11:49

## 2019-07-03 RX ADMIN — ANTACID TABLETS 500 MG: 500 TABLET, CHEWABLE ORAL at 16:36

## 2019-07-03 RX ADMIN — SENNOSIDES, DOCUSATE SODIUM 1 TABLET: 50; 8.6 TABLET, FILM COATED ORAL at 07:58

## 2019-07-03 RX ADMIN — DOCUSATE SODIUM 100 MG: 100 CAPSULE, LIQUID FILLED ORAL at 16:36

## 2019-07-03 RX ADMIN — ENOXAPARIN SODIUM 40 MG: 100 INJECTION SUBCUTANEOUS at 11:50

## 2019-07-03 RX ADMIN — POLYETHYLENE GLYCOL 3350 1 PACKET: 17 POWDER, FOR SOLUTION ORAL at 07:58

## 2019-07-03 ASSESSMENT — GAIT ASSESSMENTS
DISTANCE (FEET): 400
GAIT LEVEL OF ASSIST: SUPERVISED
ASSISTIVE DEVICE: 4 WHEEL WALKER

## 2019-07-03 ASSESSMENT — COGNITIVE AND FUNCTIONAL STATUS - GENERAL
STANDING UP FROM CHAIR USING ARMS: A LITTLE
WALKING IN HOSPITAL ROOM: A LITTLE
MOVING FROM LYING ON BACK TO SITTING ON SIDE OF FLAT BED: A LITTLE
CLIMB 3 TO 5 STEPS WITH RAILING: A LITTLE
MOVING TO AND FROM BED TO CHAIR: A LITTLE
SUGGESTED CMS G CODE MODIFIER MOBILITY: CK
TURNING FROM BACK TO SIDE WHILE IN FLAT BAD: A LITTLE
MOBILITY SCORE: 18

## 2019-07-03 ASSESSMENT — PATIENT HEALTH QUESTIONNAIRE - PHQ9
1. LITTLE INTEREST OR PLEASURE IN DOING THINGS: NOT AT ALL
SUM OF ALL RESPONSES TO PHQ9 QUESTIONS 1 AND 2: 0
2. FEELING DOWN, DEPRESSED, IRRITABLE, OR HOPELESS: NOT AT ALL

## 2019-07-03 NOTE — DISCHARGE PLANNING
Care Transition Team Assessment    Information Source  Orientation : Oriented x 4  Information Given By: Patient  Informant's Name: Sandi  Who is responsible for making decisions for patient? : Patient    Readmission Evaluation  Is this a readmission?: No    Elopement Risk  Legal Hold: No  Ambulatory or Self Mobile in Wheelchair: No-Not an Elopement Risk  Elopement Risk: Not at Risk for Elopement    Interdisciplinary Discharge Planning  Does Admitting Nurse Feel This Could be a Complex Discharge?: No  Primary Care Physician: Mary Sinha  Lives with - Patient's Self Care Capacity: Alone and Able to Care For Self  Patient or legal guardian wants to designate a caregiver (see row info): No  Support Systems: Children  Housing / Facility: 1 Story Apartment / Condo (ground level townhouse)  Do You Take your Prescribed Medications Regularly: Yes  Able to Return to Previous ADL's: No  Mobility Issues: Yes  Prior Services: Other (Comments) ([facility])  Patient Expects to be Discharged to:: SNF  Assistance Needed: Yes  Durable Medical Equipment: Walker, Commode  DME Provider / Phone: [facility]    Discharge Preparedness  What is your plan after discharge?: Skilled nursing facility  What are your discharge supports?: Child  Prior Functional Level: Independent with Activities of Daily Living, Independent with Medication Management, Uses Walker  Difficulity with ADLs: Walking  Difficulty with ADLs Comment: Has walker, Cane, BSC, and Shower chair  Difficulity with IADLs: Shopping, Laundry, Cooking  Difficulity with IADL Comments: Daughter helps when patient needs it    Functional Assesment  Prior Functional Level: Independent with Activities of Daily Living, Independent with Medication Management, Uses Walker    Finances  Financial Barriers to Discharge: No  Prescription Coverage: Yes    Vision / Hearing Impairment  Vision Impairment : Yes  Right Eye Vision: Impaired, Wears Glasses  Left Eye Vision: Impaired, Wears  Glasses  Hearing Impairment : No    Values / Beliefs / Concerns  Values / Beliefs Concerns : No    Advance Directive  Advance Directive?: None  Advance Directive offered?: AD Booklet given    Domestic Abuse  Have you ever been the victim of abuse or violence?: No  Physical Abuse or Sexual Abuse: No  Verbal Abuse or Emotional Abuse: No  Possible Abuse Reported to:: Not Applicable    Psychological Assessment  History of Substance Abuse: None  History of Psychiatric Problems: No  Non-compliant with Treatment: No  Newly Diagnosed Illness: No    Discharge Risks or Barriers  Discharge risks or barriers?: No    Anticipated Discharge Information  Anticipated discharge disposition: SNF

## 2019-07-03 NOTE — PROGRESS NOTES
Size XXL Deroyal of the shelf LSO back support brace has been delivered to pt's bed side to wear as needed.

## 2019-07-03 NOTE — THERAPY
"Physical Therapy Evaluation completed.   Bed Mobility:  Supine to Sit:  (NT)  Transfers: Sit to Stand: Supervised  Gait: Level Of Assist: Supervised with 4-Wheel Walker       Plan of Care: Will benefit from Physical Therapy 4 times per week (post additional surgery)  Discharge Recommendations: Equipment: Will Continue to Assess for Equipment Needs. See below    Pt presents to PT s/p lumbar fusion. Pt able to perform sit <> stand with 4WW with spv, ambulate 400' with 4WW with spv, and perform bed mobility with spinal precautions with min/Mod A. Pt reports that she would feel safer going to rehab prior to home; seems concerns are primarily re: self-care/ADLs. However, anticipate pt will not require continued skilled PT prior to DC home based on current functional mobility. Will re-evaluate post stage #2 surgery on 7/4 to determine status. Will continue to see and would defer to OT recs for dc planning given pt's concerns.     See \"Rehab Therapy-Acute\" Patient Summary Report for complete documentation.     "

## 2019-07-03 NOTE — PROGRESS NOTES
Neurosurgery Progress Note    Subjective:  Postop day 1 L3-5 XLIF  Reports low back pain  Denies significant iliopsoas weakness  Has persistent right lumbar radiculopathy, unchanged from surgery yesterday  CT lumbar spine pending  Ambulating well  Does note discomfort to left thigh postprocedure    Exam:  Motor 5/5 bilateral lower extremities  Dressing clean dry and intact  Mild erythema noted to left eye  Pupils equal round reactive to light, extraocular movements intact    BP  Min: 103/58  Max: 124/75  Pulse  Av.3  Min: 88  Max: 113  Resp  Av.6  Min: 12  Max: 23  Temp  Av.4 °C (97.5 °F)  Min: 36.1 °C (96.9 °F)  Max: 37.1 °C (98.7 °F)  SpO2  Av.4 %  Min: 91 %  Max: 98 %    No Data Recorded    Recent Labs      19   0322   WBC  11.3*   RBC  3.84*   HEMOGLOBIN  11.5*   HEMATOCRIT  37.1   MCV  96.6   MCH  29.9   MCHC  31.0*   RDW  47.1   PLATELETCT  194   MPV  11.0     Recent Labs      19   0322   SODIUM  136   POTASSIUM  5.1   CHLORIDE  99   CO2  26   GLUCOSE  153*   BUN  21   CREATININE  1.21   CALCIUM  8.4*               Intake/Output       19 - 1959 19 - 19 0659       Total 1900-0659 Total       Intake    P.O.  --  600 600  --  -- --    P.O. -- 600 600 -- -- --    I.V.  1200  -- 1200  --  -- --    Volume (mL) (lactated ringers infusion) 1200 -- 1200 -- -- --    Total Intake 4583 138 6330 -- -- --       Output    Urine  200  -- 200  --  -- --    Urine 200 -- 200 -- -- --    Number of Times Voided -- 2 x 2 x -- -- --    Blood  50  -- 50  --  -- --    Est. Blood Loss 50 -- 50 -- -- --    Total Output 250 -- 250 -- -- --       Net I/O      -- -- --            Intake/Output Summary (Last 24 hours) at 19 0847  Last data filed at 19 2100   Gross per 24 hour   Intake             1800 ml   Output              250 ml   Net             1550 ml            • atorvastatin  40 mg Nightly   • DULoxetine  60 mg  BID   • gabapentin  300 mg TID   • levothyroxine  75 mcg AM ES   • lisinopril  20 mg Q EVENING   • metFORMIN  500 mg BID WITH MEALS   • traZODone  100 mg Nightly   • Pharmacy Consult Request  1 Each PHARMACY TO DOSE   • MD ALERT...DO NOT ADMINISTER NSAIDS or ASPIRIN unless ORDERED By Neurosurgery  1 Each PRN   • docusate sodium  100 mg BID   • senna-docusate  1 Tab Nightly   • senna-docusate  1 Tab Q24HRS PRN   • polyethylene glycol/lytes  1 Packet BID PRN   • magnesium hydroxide  30 mL QDAY PRN   • bisacodyl  10 mg Q24HRS PRN   • fleet  1 Each Once PRN   • 0.9 % NaCl with KCl 20 mEq 1,000 mL   Continuous   • enoxaparin  40 mg DAILY   • acetaminophen  650 mg Q6HRS   • oxyCODONE immediate-release  5 mg Q3HRS PRN   • oxyCODONE immediate release  10 mg Q3HRS PRN   • HYDROmorphone  0.5-1 mg Q3HRS PRN   • diphenhydrAMINE  25 mg Q6HRS PRN    Or   • diphenhydrAMINE  25 mg Q6HRS PRN   • ondansetron  4 mg Q4HRS PRN   • ondansetron  4 mg Q4HRS PRN   • methocarbamol  750 mg Q8HRS PRN   • ALPRAZolam  0.25 mg BID PRN   • labetalol  10 mg Q HOUR PRN   • hydrALAZINE  10 mg Q HOUR PRN   • benzocaine-menthol  1 Lozenge Q2HRS PRN   • calcium carbonate  500 mg BID   • insulin regular  2-9 Units 4X/DAY ACHS    And   • glucose  16 g Q15 MIN PRN    And   • dextrose 10% bolus  250 mL Q15 MIN PRN   • artificial tears  1 Application Q2HRS PRN       Assessment and Plan:  POD #1  PT/OT today  We will order drops for left eye, likely corneal abrasion  N.p.o. after midnight  CT lumbar spine today  1 dose of Lovenox today, then hold

## 2019-07-03 NOTE — DISCHARGE PLANNING
Anticipated Discharge Disposition:   SNF    Action:   Assessment completed.  Patient states she is going to go to SNF after discharged home.  Then when she is home, she states she has DME: walker, cane, bedside commode, and shower chair.      Patient has requested to go to [facility] when discharged from hospital.  If she is to have home health care, she has used [facility].  Patient has used [facility] in the past for DMEs.    Barriers to Discharge:   Patient to have stage II surgery on 07/04/2019.    Plan:   Patient requested information on advanced directive.  Booklet given to patient.  She states her daughter will assist her completing.  Choice form to be completed once SNF referral is sent by physician.

## 2019-07-04 ENCOUNTER — APPOINTMENT (OUTPATIENT)
Dept: RADIOLOGY | Facility: MEDICAL CENTER | Age: 74
DRG: 455 | End: 2019-07-04
Attending: NEUROLOGICAL SURGERY
Payer: MEDICARE

## 2019-07-04 ENCOUNTER — ANESTHESIA EVENT (OUTPATIENT)
Dept: SURGERY | Facility: MEDICAL CENTER | Age: 74
DRG: 455 | End: 2019-07-04
Payer: MEDICARE

## 2019-07-04 ENCOUNTER — ANESTHESIA (OUTPATIENT)
Dept: SURGERY | Facility: MEDICAL CENTER | Age: 74
DRG: 455 | End: 2019-07-04
Payer: MEDICARE

## 2019-07-04 PROBLEM — E03.9 THYROID ACTIVITY DECREASED: Status: ACTIVE | Noted: 2019-07-04

## 2019-07-04 PROBLEM — E11.9 DIABETES MELLITUS, TYPE 2 (HCC): Status: ACTIVE | Noted: 2019-07-04

## 2019-07-04 PROBLEM — K44.9 HIATAL HERNIA: Status: ACTIVE | Noted: 2019-07-04

## 2019-07-04 PROBLEM — G47.30 SLEEP APNEA: Status: ACTIVE | Noted: 2019-07-04

## 2019-07-04 LAB
ANION GAP SERPL CALC-SCNC: 7 MMOL/L (ref 0–11.9)
BUN SERPL-MCNC: 19 MG/DL (ref 8–22)
CALCIUM SERPL-MCNC: 8.6 MG/DL (ref 8.5–10.5)
CHLORIDE SERPL-SCNC: 99 MMOL/L (ref 96–112)
CO2 SERPL-SCNC: 27 MMOL/L (ref 20–33)
CREAT SERPL-MCNC: 1.19 MG/DL (ref 0.5–1.4)
ERYTHROCYTE [DISTWIDTH] IN BLOOD BY AUTOMATED COUNT: 47.6 FL (ref 35.9–50)
GLUCOSE BLD-MCNC: 125 MG/DL (ref 65–99)
GLUCOSE BLD-MCNC: 181 MG/DL (ref 65–99)
GLUCOSE BLD-MCNC: 260 MG/DL (ref 65–99)
GLUCOSE SERPL-MCNC: 148 MG/DL (ref 65–99)
HCT VFR BLD AUTO: 35.5 % (ref 37–47)
HGB BLD-MCNC: 11.4 G/DL (ref 12–16)
MCH RBC QN AUTO: 30.8 PG (ref 27–33)
MCHC RBC AUTO-ENTMCNC: 32.1 G/DL (ref 33.6–35)
MCV RBC AUTO: 95.9 FL (ref 81.4–97.8)
PLATELET # BLD AUTO: 178 K/UL (ref 164–446)
PMV BLD AUTO: 10.6 FL (ref 9–12.9)
POTASSIUM SERPL-SCNC: 4.7 MMOL/L (ref 3.6–5.5)
RBC # BLD AUTO: 3.7 M/UL (ref 4.2–5.4)
SODIUM SERPL-SCNC: 133 MMOL/L (ref 135–145)
WBC # BLD AUTO: 8.6 K/UL (ref 4.8–10.8)

## 2019-07-04 PROCEDURE — 4A11X4G MONITORING OF PERIPHERAL NERVOUS ELECTRICAL ACTIVITY, INTRAOPERATIVE, EXTERNAL APPROACH: ICD-10-PCS | Performed by: NEUROLOGICAL SURGERY

## 2019-07-04 PROCEDURE — 160042 HCHG SURGERY MINUTES - EA ADDL 1 MIN LEVEL 5: Performed by: NEUROLOGICAL SURGERY

## 2019-07-04 PROCEDURE — A9270 NON-COVERED ITEM OR SERVICE: HCPCS | Performed by: ANESTHESIOLOGY

## 2019-07-04 PROCEDURE — 160035 HCHG PACU - 1ST 60 MINS PHASE I: Performed by: NEUROLOGICAL SURGERY

## 2019-07-04 PROCEDURE — 72100 X-RAY EXAM L-S SPINE 2/3 VWS: CPT

## 2019-07-04 PROCEDURE — 700102 HCHG RX REV CODE 250 W/ 637 OVERRIDE(OP): Performed by: ANESTHESIOLOGY

## 2019-07-04 PROCEDURE — 95940 IONM IN OPERATNG ROOM 15 MIN: CPT | Performed by: NEUROLOGICAL SURGERY

## 2019-07-04 PROCEDURE — 500367 HCHG DRAIN KIT, HEMOVAC: Performed by: NEUROLOGICAL SURGERY

## 2019-07-04 PROCEDURE — A4314 CATH W/DRAINAGE 2-WAY LATEX: HCPCS | Performed by: NEUROLOGICAL SURGERY

## 2019-07-04 PROCEDURE — 700101 HCHG RX REV CODE 250: Performed by: ANESTHESIOLOGY

## 2019-07-04 PROCEDURE — 160036 HCHG PACU - EA ADDL 30 MINS PHASE I: Performed by: NEUROLOGICAL SURGERY

## 2019-07-04 PROCEDURE — 85027 COMPLETE CBC AUTOMATED: CPT

## 2019-07-04 PROCEDURE — 700102 HCHG RX REV CODE 250 W/ 637 OVERRIDE(OP): Performed by: PHYSICIAN ASSISTANT

## 2019-07-04 PROCEDURE — 700112 HCHG RX REV CODE 229: Performed by: PHYSICIAN ASSISTANT

## 2019-07-04 PROCEDURE — 01NB0ZZ RELEASE LUMBAR NERVE, OPEN APPROACH: ICD-10-PCS | Performed by: NEUROLOGICAL SURGERY

## 2019-07-04 PROCEDURE — 700111 HCHG RX REV CODE 636 W/ 250 OVERRIDE (IP): Performed by: ANESTHESIOLOGY

## 2019-07-04 PROCEDURE — 160048 HCHG OR STATISTICAL LEVEL 1-5: Performed by: NEUROLOGICAL SURGERY

## 2019-07-04 PROCEDURE — 500885 HCHG PACK, JACKSON TABLE: Performed by: NEUROLOGICAL SURGERY

## 2019-07-04 PROCEDURE — 700106 HCHG RX REV CODE 271: Performed by: NEUROLOGICAL SURGERY

## 2019-07-04 PROCEDURE — 700101 HCHG RX REV CODE 250: Performed by: NEUROLOGICAL SURGERY

## 2019-07-04 PROCEDURE — 700111 HCHG RX REV CODE 636 W/ 250 OVERRIDE (IP)

## 2019-07-04 PROCEDURE — 36415 COLL VENOUS BLD VENIPUNCTURE: CPT

## 2019-07-04 PROCEDURE — 95925 SOMATOSENSORY TESTING: CPT | Performed by: NEUROLOGICAL SURGERY

## 2019-07-04 PROCEDURE — 95937 NEUROMUSCULAR JUNCTION TEST: CPT | Performed by: NEUROLOGICAL SURGERY

## 2019-07-04 PROCEDURE — 95861 NEEDLE EMG 2 EXTREMITIES: CPT | Performed by: NEUROLOGICAL SURGERY

## 2019-07-04 PROCEDURE — 502000 HCHG MISC OR IMPLANTS RC 0278: Performed by: NEUROLOGICAL SURGERY

## 2019-07-04 PROCEDURE — 501838 HCHG SUTURE GENERAL: Performed by: NEUROLOGICAL SURGERY

## 2019-07-04 PROCEDURE — 502240 HCHG MISC OR SUPPLY RC 0272: Performed by: NEUROLOGICAL SURGERY

## 2019-07-04 PROCEDURE — 700111 HCHG RX REV CODE 636 W/ 250 OVERRIDE (IP): Performed by: PHYSICIAN ASSISTANT

## 2019-07-04 PROCEDURE — 0SG1071 FUSION OF 2 OR MORE LUMBAR VERTEBRAL JOINTS WITH AUTOLOGOUS TISSUE SUBSTITUTE, POSTERIOR APPROACH, POSTERIOR COLUMN, OPEN APPROACH: ICD-10-PCS | Performed by: NEUROLOGICAL SURGERY

## 2019-07-04 PROCEDURE — 82962 GLUCOSE BLOOD TEST: CPT | Mod: 91

## 2019-07-04 PROCEDURE — 160031 HCHG SURGERY MINUTES - 1ST 30 MINS LEVEL 5: Performed by: NEUROLOGICAL SURGERY

## 2019-07-04 PROCEDURE — 700105 HCHG RX REV CODE 258: Performed by: ANESTHESIOLOGY

## 2019-07-04 PROCEDURE — A9270 NON-COVERED ITEM OR SERVICE: HCPCS | Performed by: PHYSICIAN ASSISTANT

## 2019-07-04 PROCEDURE — 80048 BASIC METABOLIC PNL TOTAL CA: CPT

## 2019-07-04 PROCEDURE — 700111 HCHG RX REV CODE 636 W/ 250 OVERRIDE (IP): Performed by: NEUROLOGICAL SURGERY

## 2019-07-04 PROCEDURE — 700105 HCHG RX REV CODE 258: Performed by: PHYSICIAN ASSISTANT

## 2019-07-04 PROCEDURE — 770001 HCHG ROOM/CARE - MED/SURG/GYN PRIV*

## 2019-07-04 PROCEDURE — 160002 HCHG RECOVERY MINUTES (STAT): Performed by: NEUROLOGICAL SURGERY

## 2019-07-04 PROCEDURE — 160009 HCHG ANES TIME/MIN: Performed by: NEUROLOGICAL SURGERY

## 2019-07-04 RX ORDER — BUPIVACAINE HYDROCHLORIDE AND EPINEPHRINE 5; 5 MG/ML; UG/ML
INJECTION, SOLUTION PERINEURAL
Status: DISCONTINUED | OUTPATIENT
Start: 2019-07-04 | End: 2019-07-04 | Stop reason: HOSPADM

## 2019-07-04 RX ORDER — HYDROMORPHONE HYDROCHLORIDE 1 MG/ML
0.1 INJECTION, SOLUTION INTRAMUSCULAR; INTRAVENOUS; SUBCUTANEOUS
Status: DISCONTINUED | OUTPATIENT
Start: 2019-07-04 | End: 2019-07-04 | Stop reason: HOSPADM

## 2019-07-04 RX ORDER — CEFAZOLIN SODIUM 1 G/3ML
INJECTION, POWDER, FOR SOLUTION INTRAMUSCULAR; INTRAVENOUS PRN
Status: DISCONTINUED | OUTPATIENT
Start: 2019-07-04 | End: 2019-07-04 | Stop reason: SURG

## 2019-07-04 RX ORDER — SODIUM CHLORIDE, SODIUM LACTATE, POTASSIUM CHLORIDE, AND CALCIUM CHLORIDE .6; .31; .03; .02 G/100ML; G/100ML; G/100ML; G/100ML
IRRIGANT IRRIGATION
Status: DISCONTINUED | OUTPATIENT
Start: 2019-07-04 | End: 2019-07-04 | Stop reason: HOSPADM

## 2019-07-04 RX ORDER — SODIUM CHLORIDE 9 MG/ML
INJECTION, SOLUTION INTRAVENOUS CONTINUOUS
Status: DISCONTINUED | OUTPATIENT
Start: 2019-07-04 | End: 2019-07-10 | Stop reason: HOSPADM

## 2019-07-04 RX ORDER — SODIUM CHLORIDE, SODIUM LACTATE, POTASSIUM CHLORIDE, CALCIUM CHLORIDE 600; 310; 30; 20 MG/100ML; MG/100ML; MG/100ML; MG/100ML
INJECTION, SOLUTION INTRAVENOUS
Status: DISCONTINUED | OUTPATIENT
Start: 2019-07-04 | End: 2019-07-04 | Stop reason: SURG

## 2019-07-04 RX ORDER — MEPERIDINE HYDROCHLORIDE 25 MG/ML
12.5 INJECTION INTRAMUSCULAR; INTRAVENOUS; SUBCUTANEOUS
Status: DISCONTINUED | OUTPATIENT
Start: 2019-07-04 | End: 2019-07-04 | Stop reason: HOSPADM

## 2019-07-04 RX ORDER — HYDROMORPHONE HYDROCHLORIDE 1 MG/ML
0.4 INJECTION, SOLUTION INTRAMUSCULAR; INTRAVENOUS; SUBCUTANEOUS
Status: DISCONTINUED | OUTPATIENT
Start: 2019-07-04 | End: 2019-07-04 | Stop reason: HOSPADM

## 2019-07-04 RX ORDER — HYDROMORPHONE HYDROCHLORIDE 1 MG/ML
0.2 INJECTION, SOLUTION INTRAMUSCULAR; INTRAVENOUS; SUBCUTANEOUS
Status: DISCONTINUED | OUTPATIENT
Start: 2019-07-04 | End: 2019-07-04 | Stop reason: HOSPADM

## 2019-07-04 RX ORDER — DIPHENHYDRAMINE HYDROCHLORIDE 50 MG/ML
12.5 INJECTION INTRAMUSCULAR; INTRAVENOUS
Status: DISCONTINUED | OUTPATIENT
Start: 2019-07-04 | End: 2019-07-04 | Stop reason: HOSPADM

## 2019-07-04 RX ORDER — OXYCODONE HCL 5 MG/5 ML
10 SOLUTION, ORAL ORAL
Status: COMPLETED | OUTPATIENT
Start: 2019-07-04 | End: 2019-07-04

## 2019-07-04 RX ORDER — SODIUM CHLORIDE, SODIUM LACTATE, POTASSIUM CHLORIDE, CALCIUM CHLORIDE 600; 310; 30; 20 MG/100ML; MG/100ML; MG/100ML; MG/100ML
INJECTION, SOLUTION INTRAVENOUS CONTINUOUS
Status: DISCONTINUED | OUTPATIENT
Start: 2019-07-04 | End: 2019-07-04 | Stop reason: HOSPADM

## 2019-07-04 RX ORDER — OXYCODONE HCL 5 MG/5 ML
5 SOLUTION, ORAL ORAL
Status: COMPLETED | OUTPATIENT
Start: 2019-07-04 | End: 2019-07-04

## 2019-07-04 RX ORDER — LABETALOL HYDROCHLORIDE 5 MG/ML
5 INJECTION, SOLUTION INTRAVENOUS
Status: DISCONTINUED | OUTPATIENT
Start: 2019-07-04 | End: 2019-07-04 | Stop reason: HOSPADM

## 2019-07-04 RX ORDER — CEFAZOLIN SODIUM 2 G/100ML
2 INJECTION, SOLUTION INTRAVENOUS EVERY 8 HOURS
Status: COMPLETED | OUTPATIENT
Start: 2019-07-04 | End: 2019-07-05

## 2019-07-04 RX ORDER — ONDANSETRON 2 MG/ML
INJECTION INTRAMUSCULAR; INTRAVENOUS PRN
Status: DISCONTINUED | OUTPATIENT
Start: 2019-07-04 | End: 2019-07-04 | Stop reason: SURG

## 2019-07-04 RX ORDER — PHENYLEPHRINE HYDROCHLORIDE 10 MG/ML
INJECTION, SOLUTION INTRAMUSCULAR; INTRAVENOUS; SUBCUTANEOUS PRN
Status: DISCONTINUED | OUTPATIENT
Start: 2019-07-04 | End: 2019-07-04 | Stop reason: SURG

## 2019-07-04 RX ORDER — ONDANSETRON 2 MG/ML
4 INJECTION INTRAMUSCULAR; INTRAVENOUS
Status: DISCONTINUED | OUTPATIENT
Start: 2019-07-04 | End: 2019-07-04 | Stop reason: HOSPADM

## 2019-07-04 RX ORDER — VANCOMYCIN HYDROCHLORIDE 1 G/20ML
INJECTION, POWDER, LYOPHILIZED, FOR SOLUTION INTRAVENOUS
Status: COMPLETED | OUTPATIENT
Start: 2019-07-04 | End: 2019-07-04

## 2019-07-04 RX ORDER — BACITRACIN 50000 [IU]/1
INJECTION, POWDER, FOR SOLUTION INTRAMUSCULAR
Status: DISCONTINUED | OUTPATIENT
Start: 2019-07-04 | End: 2019-07-04 | Stop reason: HOSPADM

## 2019-07-04 RX ORDER — DEXAMETHASONE SODIUM PHOSPHATE 4 MG/ML
INJECTION, SOLUTION INTRA-ARTICULAR; INTRALESIONAL; INTRAMUSCULAR; INTRAVENOUS; SOFT TISSUE PRN
Status: DISCONTINUED | OUTPATIENT
Start: 2019-07-04 | End: 2019-07-04 | Stop reason: SURG

## 2019-07-04 RX ADMIN — OXYCODONE HYDROCHLORIDE 10 MG: 10 TABLET ORAL at 20:10

## 2019-07-04 RX ADMIN — TRAZODONE HYDROCHLORIDE 100 MG: 100 TABLET ORAL at 21:00

## 2019-07-04 RX ADMIN — DULOXETINE HYDROCHLORIDE 60 MG: 60 CAPSULE, DELAYED RELEASE ORAL at 04:00

## 2019-07-04 RX ADMIN — ONDANSETRON 4 MG: 2 INJECTION INTRAMUSCULAR; INTRAVENOUS at 14:25

## 2019-07-04 RX ADMIN — DOCUSATE SODIUM 100 MG: 100 CAPSULE, LIQUID FILLED ORAL at 04:00

## 2019-07-04 RX ADMIN — LIDOCAINE HYDROCHLORIDE 30 MG: 20 INJECTION, SOLUTION INTRAVENOUS at 12:05

## 2019-07-04 RX ADMIN — FENTANYL CITRATE 50 MCG: 50 INJECTION, SOLUTION INTRAMUSCULAR; INTRAVENOUS at 13:59

## 2019-07-04 RX ADMIN — SODIUM CHLORIDE, POTASSIUM CHLORIDE, SODIUM LACTATE AND CALCIUM CHLORIDE: 600; 310; 30; 20 INJECTION, SOLUTION INTRAVENOUS at 13:10

## 2019-07-04 RX ADMIN — INSULIN HUMAN 5 UNITS: 100 INJECTION, SOLUTION PARENTERAL at 21:16

## 2019-07-04 RX ADMIN — MIDAZOLAM HYDROCHLORIDE 2 MG: 1 INJECTION, SOLUTION INTRAMUSCULAR; INTRAVENOUS at 12:00

## 2019-07-04 RX ADMIN — CEFAZOLIN 2 G: 330 INJECTION, POWDER, FOR SOLUTION INTRAMUSCULAR; INTRAVENOUS at 12:08

## 2019-07-04 RX ADMIN — DEXAMETHASONE SODIUM PHOSPHATE 4 MG: 4 INJECTION, SOLUTION INTRA-ARTICULAR; INTRALESIONAL; INTRAMUSCULAR; INTRAVENOUS; SOFT TISSUE at 12:15

## 2019-07-04 RX ADMIN — METFORMIN HYDROCHLORIDE 500 MG: 500 TABLET, FILM COATED ORAL at 17:21

## 2019-07-04 RX ADMIN — FENTANYL CITRATE 100 MCG: 50 INJECTION, SOLUTION INTRAMUSCULAR; INTRAVENOUS at 12:05

## 2019-07-04 RX ADMIN — SODIUM CHLORIDE: 9 INJECTION, SOLUTION INTRAVENOUS at 17:22

## 2019-07-04 RX ADMIN — PROPOFOL 120 MCG/KG/MIN: 10 INJECTION, EMULSION INTRAVENOUS at 12:05

## 2019-07-04 RX ADMIN — ANTACID TABLETS 500 MG: 500 TABLET, CHEWABLE ORAL at 20:15

## 2019-07-04 RX ADMIN — ATORVASTATIN CALCIUM 40 MG: 40 TABLET, FILM COATED ORAL at 17:21

## 2019-07-04 RX ADMIN — SENNOSIDES, DOCUSATE SODIUM 1 TABLET: 50; 8.6 TABLET, FILM COATED ORAL at 20:14

## 2019-07-04 RX ADMIN — PROPOFOL 120 MG: 10 INJECTION, EMULSION INTRAVENOUS at 12:05

## 2019-07-04 RX ADMIN — GABAPENTIN 300 MG: 300 CAPSULE ORAL at 17:21

## 2019-07-04 RX ADMIN — FENTANYL CITRATE 25 MCG: 50 INJECTION, SOLUTION INTRAMUSCULAR; INTRAVENOUS at 15:32

## 2019-07-04 RX ADMIN — CEFAZOLIN SODIUM 2 G: 2 INJECTION, SOLUTION INTRAVENOUS at 20:14

## 2019-07-04 RX ADMIN — KETOROLAC TROMETHAMINE 1 DROP: 5 SOLUTION/ DROPS OPHTHALMIC at 21:00

## 2019-07-04 RX ADMIN — SODIUM CHLORIDE, POTASSIUM CHLORIDE, SODIUM LACTATE AND CALCIUM CHLORIDE: 600; 310; 30; 20 INJECTION, SOLUTION INTRAVENOUS at 12:00

## 2019-07-04 RX ADMIN — DULOXETINE HYDROCHLORIDE 60 MG: 60 CAPSULE, DELAYED RELEASE ORAL at 17:21

## 2019-07-04 RX ADMIN — DOCUSATE SODIUM 100 MG: 100 CAPSULE, LIQUID FILLED ORAL at 17:21

## 2019-07-04 RX ADMIN — ACETAMINOPHEN 650 MG: 325 TABLET, FILM COATED ORAL at 17:21

## 2019-07-04 RX ADMIN — FENTANYL CITRATE 50 MCG: 50 INJECTION, SOLUTION INTRAMUSCULAR; INTRAVENOUS at 14:29

## 2019-07-04 RX ADMIN — GABAPENTIN 300 MG: 300 CAPSULE ORAL at 03:53

## 2019-07-04 RX ADMIN — LEVOTHYROXINE SODIUM 75 MCG: 75 TABLET ORAL at 04:00

## 2019-07-04 RX ADMIN — ROCURONIUM BROMIDE 25 MG: 10 INJECTION, SOLUTION INTRAVENOUS at 12:05

## 2019-07-04 RX ADMIN — FENTANYL CITRATE 25 MCG: 50 INJECTION, SOLUTION INTRAMUSCULAR; INTRAVENOUS at 15:40

## 2019-07-04 RX ADMIN — METHOCARBAMOL 750 MG: 100 INJECTION INTRAMUSCULAR; INTRAVENOUS at 18:28

## 2019-07-04 RX ADMIN — PHENYLEPHRINE HYDROCHLORIDE 100 MCG: 10 INJECTION INTRAVENOUS at 12:33

## 2019-07-04 RX ADMIN — ACETAMINOPHEN 650 MG: 325 TABLET, FILM COATED ORAL at 03:53

## 2019-07-04 RX ADMIN — FENTANYL CITRATE 50 MCG: 50 INJECTION, SOLUTION INTRAMUSCULAR; INTRAVENOUS at 13:31

## 2019-07-04 RX ADMIN — OXYCODONE HYDROCHLORIDE 5 MG: 5 SOLUTION ORAL at 15:29

## 2019-07-04 RX ADMIN — INSULIN HUMAN 2 UNITS: 100 INJECTION, SOLUTION PARENTERAL at 17:21

## 2019-07-04 ASSESSMENT — COPD QUESTIONNAIRES
DURING THE PAST 4 WEEKS HOW MUCH DID YOU FEEL SHORT OF BREATH: NONE/LITTLE OF THE TIME
DO YOU EVER COUGH UP ANY MUCUS OR PHLEGM?: NO/ONLY WITH OCCASIONAL COLDS OR INFECTIONS
HAVE YOU SMOKED AT LEAST 100 CIGARETTES IN YOUR ENTIRE LIFE: NO/DON'T KNOW
COPD SCREENING SCORE: 2

## 2019-07-04 ASSESSMENT — LIFESTYLE VARIABLES: EVER_SMOKED: NEVER

## 2019-07-04 ASSESSMENT — PAIN SCALES - GENERAL: PAIN_LEVEL: 3

## 2019-07-04 NOTE — ANESTHESIA PREPROCEDURE EVALUATION
Relevant Problems   (+) Diabetes mellitus, type 2 (HCC)   (+) Essential hypertension   (+) Hiatal hernia   (+) Sleep apnea   (+) Thyroid activity decreased       Physical Exam    Airway   Mallampati: II  TM distance: >3 FB  Neck ROM: full       Cardiovascular - normal exam  Rhythm: regular  Rate: normal  (-) murmur     Dental - normal exam  (+) upper dentures         Pulmonary - normal exam  Breath sounds clear to auscultation     Abdominal    Neurological - normal exam                 Anesthesia Plan    ASA 2       Plan - general       Airway plan will be ETT        Induction: intravenous    Postoperative Plan: Postoperative administration of opioids is intended.    Pertinent diagnostic labs and testing reviewed    Informed Consent:    Anesthetic plan and risks discussed with patient.    Use of blood products discussed with: patient whom consented to blood products.

## 2019-07-04 NOTE — ANESTHESIA QCDR
2019 [facility] Clinical Data Registry (for Quality Improvement)     Postoperative nausea/vomiting risk protocol (Adult = 18 yrs and Pediatric 3-17 yrs)- (430 and 463)  General inhalation anesthetic (NOT TIVA) with PONV risk factors: Yes  Provision of anti-emetic therapy with at least 2 different classes of agents: Yes   Patient DID NOT receive anti-emetic therapy and reason is documented in Medical Record:  N/A    Multimodal Pain Management- (AQI59)  Patient undergoing Elective Surgery (i.e. Outpatient, or ASC, or Prescheduled Surgery prior to Hospital Admission): Yes  Use of Multimodal Pain Management, two or more drugs and/or interventions, NOT including systemic opioids: Yes   Exception: Documented allergy to multiple classes of analgesics:  N/A    PACU assessment of acute postoperative pain prior to Anesthesia Care End- Applies to Patients Age = 18- (ABG7)  Initial PACU pain score is which of the following: < 7/10  Patient unable to report pain score: N/A    Post-anesthetic transfer of care checklist/protocol to PACU/ICU- (426 and 427)  Upon conclusion of case, patient transferred to which of the following locations: PACU/Non-ICU  Use of transfer checklist/protocol: Yes  Exclusion: Service Performed in Patient Hospital Room (and thus did not require transfer): N/A    PACU Reintubation- (AQI31)  General anesthesia requiring endotracheal intubation (ETT) along with subsequent extubation in OR or PACU: Yes  Required reintubation in the PACU: No   Extubation was a planned trial documented in the medical record prior to removal of the original airway device:  N/A    Unplanned admission to ICU related to anesthesia service up through end of PACU care- (MD51)  Unplanned admission to ICU (not initially anticipated at anesthesia start time): No

## 2019-07-04 NOTE — ANESTHESIA PROCEDURE NOTES
Airway  Date/Time: 7/4/2019 12:07 PM  Performed by: HENRY SEAY  Authorized by: HERNY SEAY     Location:  OR  Urgency:  Elective  Difficult Airway: No    Indications for Airway Management:  Anesthesia  Spontaneous Ventilation: absent    Sedation Level:  Deep  Preoxygenated: Yes    Patient Position:  Sniffing  Mask Difficulty Assessment:  3 - difficult mask (inadequate, unstable or two providers) +/- NMBA  Final Airway Type:  Endotracheal airway  Final Endotracheal Airway:  ETT  Cuffed: Yes    Technique Used for Successful ETT Placement:  Direct laryngoscopy  Insertion Site:  Oral  Blade Type:  Dominik  Laryngoscope Blade/Videolaryngoscope Blade Size:  3  ETT Size (mm):  7.0  Measured from:  Teeth  ETT to Teeth (cm):  23  Placement Verified by: auscultation and capnometry    Cormack-Lehane Classification:  Grade I - full view of glottis  Number of Attempts at Approach:  1

## 2019-07-04 NOTE — OR NURSING
Patient VSS. A+OX4. States pain tolerable 3/10.  Able to wean o2 to 2l/nc  Denies sob or dyspnea.  Left flank dressing clean and dry.  Hemovac x1 with scant amount of bloody drainage.  Plan to return to s175. Report called to Connor BOYLE.  Daughter updated with patient plan

## 2019-07-04 NOTE — PROGRESS NOTES
Neurosurgery Progress Note    Subjective:  Postop day 2 L3-5 XLIF  Mild surgical back pain  Left thigh paresthesia improved   Radicular pain better   Ambulating well  Lumbar CT stable    Patient seen and evaluated by Dr. Urbina  NPO for surgery today   Labs stable  Hypotensive but asymptomatic     Exam:  Motor 5/5 bilateral lower extremities  Dressing clean dry and intact  Mild erythema noted to left eye    BP  Min: 95/58  Max: 139/67  Pulse  Av  Min: 70  Max: 99  Resp  Av.6  Min: 16  Max: 18  Temp  Av.4 °C (97.5 °F)  Min: 36.2 °C (97.1 °F)  Max: 36.8 °C (98.2 °F)  SpO2  Av.2 %  Min: 94 %  Max: 98 %    No Data Recorded    Recent Labs      19   03219   0405   WBC  11.3*  8.6   RBC  3.84*  3.70*   HEMOGLOBIN  11.5*  11.4*   HEMATOCRIT  37.1  35.5*   MCV  96.6  95.9   MCH  29.9  30.8   MCHC  31.0*  32.1*   RDW  47.1  47.6   PLATELETCT  194  178   MPV  11.0  10.6     Recent Labs      19   0322  19   0405   SODIUM  136  133*   POTASSIUM  5.1  4.7   CHLORIDE  99  99   CO2  26  27   GLUCOSE  153*  148*   BUN  21  19   CREATININE  1.21  1.19   CALCIUM  8.4*  8.6               Intake/Output       19 - 1959 19 - 1959       Total  Total       Intake    P.O.  1000  -- 1000  0  -- 0    P.O. 1000 -- 1000 0 -- 0    I.V.  1645.7  -- 1645.7  --  -- --    Volume (mL) (0.9 % NaCl with KCl 20 mEq infusion) 1608.3 -- 1608.3 -- -- --    Volume (mL) (NS infusion) 15 -- 15 -- -- --    Volume (mL) (lactated ringers infusion) 22.3 -- 22.3 -- -- --    Total Intake 2645.7 -- 2645.7 0 -- 0       Output    Urine  --  -- --  --  -- --    Number of Times Voided 4 x 5 x 9 x -- -- --    Stool  --  -- --  --  -- --    Number of Times Stooled -- 1 x 1 x -- -- --    Total Output -- -- -- -- -- --       Net I/O     2645.7 -- 2645.7 0 -- 0            Intake/Output Summary (Last 24 hours) at 19 1155  Last data filed at 19  0800   Gross per 24 hour   Intake          2245.66 ml   Output                0 ml   Net          2245.66 ml            • [MAR Hold] ketorolac  1 Drop 4X/DAY   • NS   Continuous   • [MAR Hold] atorvastatin  40 mg Nightly   • [MAR Hold] DULoxetine  60 mg BID   • [MAR Hold] gabapentin  300 mg TID   • [MAR Hold] levothyroxine  75 mcg AM ES   • [MAR Hold] lisinopril  20 mg Q EVENING   • [MAR Hold] metFORMIN  500 mg BID WITH MEALS   • [MAR Hold] traZODone  100 mg Nightly   • [MAR Hold] Pharmacy Consult Request  1 Each PHARMACY TO DOSE   • [MAR Hold] MD ALERT...DO NOT ADMINISTER NSAIDS or ASPIRIN unless ORDERED By Neurosurgery  1 Each PRN   • [MAR Hold] docusate sodium  100 mg BID   • [MAR Hold] senna-docusate  1 Tab Nightly   • [MAR Hold] senna-docusate  1 Tab Q24HRS PRN   • [MAR Hold] polyethylene glycol/lytes  1 Packet BID PRN   • [MAR Hold] magnesium hydroxide  30 mL QDAY PRN   • [MAR Hold] bisacodyl  10 mg Q24HRS PRN   • [MAR Hold] fleet  1 Each Once PRN   • [MAR Hold] acetaminophen  650 mg Q6HRS   • [MAR Hold] oxyCODONE immediate-release  5 mg Q3HRS PRN   • [MAR Hold] oxyCODONE immediate release  10 mg Q3HRS PRN   • [MAR Hold] HYDROmorphone  0.5-1 mg Q3HRS PRN   • [MAR Hold] diphenhydrAMINE  25 mg Q6HRS PRN    Or   • [MAR Hold] diphenhydrAMINE  25 mg Q6HRS PRN   • [MAR Hold] ondansetron  4 mg Q4HRS PRN   • [MAR Hold] ondansetron  4 mg Q4HRS PRN   • [MAR Hold] methocarbamol  750 mg Q8HRS PRN   • [MAR Hold] ALPRAZolam  0.25 mg BID PRN   • [MAR Hold] labetalol  10 mg Q HOUR PRN   • [MAR Hold] hydrALAZINE  10 mg Q HOUR PRN   • [MAR Hold] benzocaine-menthol  1 Lozenge Q2HRS PRN   • [MAR Hold] calcium carbonate  500 mg BID   • [MAR Hold] insulin regular  2-9 Units 4X/DAY ACHS    And   • [MAR Hold] glucose  16 g Q15 MIN PRN    And   • [MAR Hold] dextrose 10% bolus  250 mL Q15 MIN PRN   • [MAR Hold] artificial tears  1 Application Q2HRS PRN       Assessment and Plan:  POD #2  Stage 2 surgery today

## 2019-07-04 NOTE — CARE PLAN
Problem: Knowledge Deficit  Goal: Knowledge of disease process/condition, treatment plan, diagnostic tests, and medications will improve  Outcome: PROGRESSING AS EXPECTED      Problem: Pain Management  Goal: Pain level will decrease to patient's comfort goal  Outcome: PROGRESSING AS EXPECTED      Problem: Mobility  Goal: Risk for activity intolerance will decrease  Outcome: PROGRESSING AS EXPECTED

## 2019-07-04 NOTE — ANESTHESIA TIME REPORT
Anesthesia Start and Stop Event Times     Date Time Event    7/4/2019 1200 Anesthesia Start     1447 Anesthesia Stop        Responsible Staff  07/04/19    Name Role Begin End    Gilles Samuel M.D. Anesth 1200 1447        Preop Diagnosis (Free Text):  Pre-op Diagnosis     LUMBAR STENOSIS, SPONDYLOSIS, RADICULOPATHY        Preop Diagnosis (Codes):  Diagnosis Information     Diagnosis Code(s): Lumbar stenosis [M48.061]     Spondylosis [M47.9]     Lumbar radiculopathy [M54.16]        Post op Diagnosis  Lumbar stenosis      Premium Reason  F. Holiday    Comments:

## 2019-07-05 ENCOUNTER — APPOINTMENT (OUTPATIENT)
Dept: RADIOLOGY | Facility: MEDICAL CENTER | Age: 74
DRG: 455 | End: 2019-07-05
Attending: NEUROLOGICAL SURGERY
Payer: MEDICARE

## 2019-07-05 LAB
ANION GAP SERPL CALC-SCNC: 6 MMOL/L (ref 0–11.9)
BUN SERPL-MCNC: 16 MG/DL (ref 8–22)
CALCIUM SERPL-MCNC: 8.2 MG/DL (ref 8.5–10.5)
CHLORIDE SERPL-SCNC: 99 MMOL/L (ref 96–112)
CO2 SERPL-SCNC: 28 MMOL/L (ref 20–33)
CREAT SERPL-MCNC: 0.99 MG/DL (ref 0.5–1.4)
ERYTHROCYTE [DISTWIDTH] IN BLOOD BY AUTOMATED COUNT: 47.7 FL (ref 35.9–50)
GLUCOSE BLD-MCNC: 133 MG/DL (ref 65–99)
GLUCOSE BLD-MCNC: 140 MG/DL (ref 65–99)
GLUCOSE BLD-MCNC: 163 MG/DL (ref 65–99)
GLUCOSE BLD-MCNC: 163 MG/DL (ref 65–99)
GLUCOSE SERPL-MCNC: 218 MG/DL (ref 65–99)
HCT VFR BLD AUTO: 28 % (ref 37–47)
HGB BLD-MCNC: 8.8 G/DL (ref 12–16)
MCH RBC QN AUTO: 30.7 PG (ref 27–33)
MCHC RBC AUTO-ENTMCNC: 31.4 G/DL (ref 33.6–35)
MCV RBC AUTO: 97.6 FL (ref 81.4–97.8)
PLATELET # BLD AUTO: 145 K/UL (ref 164–446)
PMV BLD AUTO: 10.7 FL (ref 9–12.9)
POTASSIUM SERPL-SCNC: 4.3 MMOL/L (ref 3.6–5.5)
RBC # BLD AUTO: 2.87 M/UL (ref 4.2–5.4)
SODIUM SERPL-SCNC: 133 MMOL/L (ref 135–145)
WBC # BLD AUTO: 7.1 K/UL (ref 4.8–10.8)

## 2019-07-05 PROCEDURE — 97530 THERAPEUTIC ACTIVITIES: CPT

## 2019-07-05 PROCEDURE — 97535 SELF CARE MNGMENT TRAINING: CPT

## 2019-07-05 PROCEDURE — 82962 GLUCOSE BLOOD TEST: CPT

## 2019-07-05 PROCEDURE — 36415 COLL VENOUS BLD VENIPUNCTURE: CPT

## 2019-07-05 PROCEDURE — 80048 BASIC METABOLIC PNL TOTAL CA: CPT

## 2019-07-05 PROCEDURE — A9270 NON-COVERED ITEM OR SERVICE: HCPCS | Performed by: PHYSICIAN ASSISTANT

## 2019-07-05 PROCEDURE — 700111 HCHG RX REV CODE 636 W/ 250 OVERRIDE (IP): Performed by: PHYSICIAN ASSISTANT

## 2019-07-05 PROCEDURE — 700102 HCHG RX REV CODE 250 W/ 637 OVERRIDE(OP): Performed by: PHYSICIAN ASSISTANT

## 2019-07-05 PROCEDURE — 700112 HCHG RX REV CODE 229: Performed by: PHYSICIAN ASSISTANT

## 2019-07-05 PROCEDURE — 770001 HCHG ROOM/CARE - MED/SURG/GYN PRIV*

## 2019-07-05 PROCEDURE — 700105 HCHG RX REV CODE 258: Performed by: PHYSICIAN ASSISTANT

## 2019-07-05 PROCEDURE — 85027 COMPLETE CBC AUTOMATED: CPT

## 2019-07-05 PROCEDURE — 97166 OT EVAL MOD COMPLEX 45 MIN: CPT

## 2019-07-05 RX ORDER — METHOCARBAMOL 750 MG/1
750 TABLET, FILM COATED ORAL 4 TIMES DAILY PRN
Status: DISCONTINUED | OUTPATIENT
Start: 2019-07-05 | End: 2019-07-10 | Stop reason: HOSPADM

## 2019-07-05 RX ORDER — KETOROLAC TROMETHAMINE 30 MG/ML
15 INJECTION, SOLUTION INTRAMUSCULAR; INTRAVENOUS EVERY 6 HOURS PRN
Status: DISPENSED | OUTPATIENT
Start: 2019-07-05 | End: 2019-07-06

## 2019-07-05 RX ADMIN — KETOROLAC TROMETHAMINE 1 DROP: 5 SOLUTION/ DROPS OPHTHALMIC at 21:51

## 2019-07-05 RX ADMIN — ACETAMINOPHEN 650 MG: 325 TABLET, FILM COATED ORAL at 17:26

## 2019-07-05 RX ADMIN — METHOCARBAMOL 750 MG: 100 INJECTION INTRAMUSCULAR; INTRAVENOUS at 09:33

## 2019-07-05 RX ADMIN — GABAPENTIN 300 MG: 300 CAPSULE ORAL at 11:55

## 2019-07-05 RX ADMIN — GABAPENTIN 300 MG: 300 CAPSULE ORAL at 04:54

## 2019-07-05 RX ADMIN — OXYCODONE HYDROCHLORIDE 10 MG: 10 TABLET ORAL at 21:51

## 2019-07-05 RX ADMIN — METFORMIN HYDROCHLORIDE 500 MG: 500 TABLET, FILM COATED ORAL at 17:27

## 2019-07-05 RX ADMIN — DULOXETINE HYDROCHLORIDE 60 MG: 60 CAPSULE, DELAYED RELEASE ORAL at 17:27

## 2019-07-05 RX ADMIN — LISINOPRIL 20 MG: 20 TABLET ORAL at 17:28

## 2019-07-05 RX ADMIN — OXYCODONE HYDROCHLORIDE 10 MG: 10 TABLET ORAL at 04:53

## 2019-07-05 RX ADMIN — OXYCODONE HYDROCHLORIDE 10 MG: 10 TABLET ORAL at 14:23

## 2019-07-05 RX ADMIN — INSULIN HUMAN 2 UNITS: 100 INJECTION, SOLUTION PARENTERAL at 08:36

## 2019-07-05 RX ADMIN — GABAPENTIN 300 MG: 300 CAPSULE ORAL at 17:27

## 2019-07-05 RX ADMIN — INSULIN HUMAN 2 UNITS: 100 INJECTION, SOLUTION PARENTERAL at 17:30

## 2019-07-05 RX ADMIN — ANTACID TABLETS 500 MG: 500 TABLET, CHEWABLE ORAL at 06:00

## 2019-07-05 RX ADMIN — DULOXETINE HYDROCHLORIDE 60 MG: 60 CAPSULE, DELAYED RELEASE ORAL at 04:55

## 2019-07-05 RX ADMIN — ACETAMINOPHEN 650 MG: 325 TABLET, FILM COATED ORAL at 11:55

## 2019-07-05 RX ADMIN — METFORMIN HYDROCHLORIDE 500 MG: 500 TABLET, FILM COATED ORAL at 09:32

## 2019-07-05 RX ADMIN — CEFAZOLIN SODIUM 2 G: 2 INJECTION, SOLUTION INTRAVENOUS at 04:00

## 2019-07-05 RX ADMIN — ENOXAPARIN SODIUM 30 MG: 100 INJECTION SUBCUTANEOUS at 14:16

## 2019-07-05 RX ADMIN — DOCUSATE SODIUM 100 MG: 100 CAPSULE, LIQUID FILLED ORAL at 06:00

## 2019-07-05 RX ADMIN — KETOROLAC TROMETHAMINE 1 DROP: 5 SOLUTION/ DROPS OPHTHALMIC at 14:15

## 2019-07-05 RX ADMIN — ATORVASTATIN CALCIUM 40 MG: 40 TABLET, FILM COATED ORAL at 17:27

## 2019-07-05 RX ADMIN — LEVOTHYROXINE SODIUM 75 MCG: 75 TABLET ORAL at 04:54

## 2019-07-05 RX ADMIN — KETOROLAC TROMETHAMINE 1 DROP: 5 SOLUTION/ DROPS OPHTHALMIC at 17:29

## 2019-07-05 RX ADMIN — ANTACID TABLETS 500 MG: 500 TABLET, CHEWABLE ORAL at 17:29

## 2019-07-05 RX ADMIN — TRAZODONE HYDROCHLORIDE 100 MG: 100 TABLET ORAL at 21:51

## 2019-07-05 RX ADMIN — METHOCARBAMOL 750 MG: 100 INJECTION INTRAMUSCULAR; INTRAVENOUS at 00:00

## 2019-07-05 RX ADMIN — ACETAMINOPHEN 650 MG: 325 TABLET, FILM COATED ORAL at 06:00

## 2019-07-05 RX ADMIN — SENNOSIDES, DOCUSATE SODIUM 1 TABLET: 50; 8.6 TABLET, FILM COATED ORAL at 21:51

## 2019-07-05 RX ADMIN — KETOROLAC TROMETHAMINE 15 MG: 30 INJECTION, SOLUTION INTRAMUSCULAR at 08:43

## 2019-07-05 RX ADMIN — DOCUSATE SODIUM 100 MG: 100 CAPSULE, LIQUID FILLED ORAL at 17:29

## 2019-07-05 RX ADMIN — KETOROLAC TROMETHAMINE 1 DROP: 5 SOLUTION/ DROPS OPHTHALMIC at 09:13

## 2019-07-05 ASSESSMENT — COGNITIVE AND FUNCTIONAL STATUS - GENERAL
SUGGESTED CMS G CODE MODIFIER MOBILITY: CK
WALKING IN HOSPITAL ROOM: A LITTLE
DRESSING REGULAR LOWER BODY CLOTHING: A LITTLE
CLIMB 3 TO 5 STEPS WITH RAILING: A LITTLE
MOVING TO AND FROM BED TO CHAIR: A LITTLE
MOBILITY SCORE: 18
MOVING FROM LYING ON BACK TO SITTING ON SIDE OF FLAT BED: A LITTLE
DAILY ACTIVITIY SCORE: 23
SUGGESTED CMS G CODE MODIFIER DAILY ACTIVITY: CI
STANDING UP FROM CHAIR USING ARMS: A LITTLE
TURNING FROM BACK TO SIDE WHILE IN FLAT BAD: A LITTLE

## 2019-07-05 ASSESSMENT — GAIT ASSESSMENTS
ASSISTIVE DEVICE: 4 WHEEL WALKER
GAIT LEVEL OF ASSIST: SUPERVISED
DISTANCE (FEET): 200

## 2019-07-05 ASSESSMENT — ACTIVITIES OF DAILY LIVING (ADL): TOILETING: INDEPENDENT

## 2019-07-05 NOTE — OR SURGEON
Immediate Post OP Note    PreOp Diagnosis: L3-5 DDD, stenosis, radiculopathy, low back pain    PostOp Diagnosis: same     Procedure(s):  STAGE #2 - redo inferior L3-5 laminectomy with L3-5 pedicle screw fusion   Surgery on 7/4/19    Surgeon(s):  Nestor Urbina M.D.    Anesthesiologist/Type of Anesthesia:  Anesthesiologist: Gilles Samuel M.D./General    Surgical Staff:  Assistant: Dyana Mireles P.A.-C.  Circulator: Naomi Quinones R.N.  Relief Circulator: Kathryn Yung R.N.  Scrub Person: Amaury Wong  Radiology Technologist: Jaimee Ly    Specimens removed if any:  * No specimens in log *    Estimated Blood Loss: 150cc    Findings: went well, see dictation     Complications: none         7/5/2019 8:00 AM Dyana Mireles P.A.-C.

## 2019-07-05 NOTE — FACE TO FACE
Face to Face Supporting Documentation - Home Health    The encounter with this patient was in whole or in part the primary reason for home health admission.    Date of encounter:   Patient:                    MRN:                       YOB: 2019  WILTON Chowdary  [id number]  1945     Home health to see patient for:  Skilled Nursing care for assessment, interventions & education, Physical Therapy evaluation and treatment and Occupational therapy evaluation and treatment    Skilled need for:  Surgical Aftercare post surgery care / rehab     Skilled nursing interventions to include:  Comment: post surgery care / rehab     Homebound status evidenced by:  Needs the assistance of another person in order to leave the home. Leaving home requires a considerable and taxing effort. There is a normal inability to leave the home.    Community Physician to provide follow up care: BRI Diggs     Optional Interventions? No      I certify the face to face encounter for this home health care referral meets the CMS requirements and the encounter/clinical assessment with the patient was, in whole, or in part, for the medical condition(s) listed above, which is the primary reason for home health care. Based on my clinical findings: the service(s) are medically necessary, support the need for home health care, and the homebound criteria are met.  I certify that this patient has had a face to face encounter by myself.  Dyana Mireles P.A.-C. - NPI: [id number]

## 2019-07-05 NOTE — DISCHARGE PLANNING
Anticipated Discharge Disposition:   Home with home health    Action:    Spoke to patient and she stated she lives alone in a single story home in [facility].  She has a boyfriend who lives in [place] and a daughter nearby who can assist her for transportation or as needed.  Pt has purchased frozen meals and can heat them up herself.  She has a four wheel walker in her room.  She was up today with PT and feels very comfortable going home when dc'd with home health.  Choices obtained and form faxed to [facility].      Spoke with ALEXA Mireles and requested home health and face to face please.    Barriers to Discharge:    [facility]  Home health acceptance    Plan:    Wait for home health acceptance.

## 2019-07-05 NOTE — THERAPY
"Physical Therapy Treatment completed.   Bed Mobility:  Supine to Sit:  (NT)  Transfers: Sit to Stand: Supervised  Gait: Level Of Assist: Supervised with 4-Wheel Walker       Plan of Care: Patient with no further skilled PT needs in the acute care setting at this time  Discharge Recommendations: Equipment: 4-Wheel Walker. See below    Pt progressing as expected given recent lumbar surgery. Pt seen in AM but politely declined ambulation 2' to back pain. In PM, patient able to ambulate with 4WW for 200' with spv, and perform sit <> stands and bed mobility with spv. Pt explains that after working with OT, she is agreeable to DC home and feels confident that she will be able to perform all ADL/IADLs upon DC home. Pt is functionally capable for DC home given continued pain control and OT recommendations re: self-care.     See \"Rehab Therapy-Acute\" Patient Summary Report for complete documentation.       "

## 2019-07-05 NOTE — ANESTHESIA POSTPROCEDURE EVALUATION
Patient: WILTON Chowdary    Procedure Summary     Date:  07/04/19 Room / Location:  [facility] / SURGERY [facility]    Anesthesia Start:  1200 Anesthesia Stop:  1447    Procedures:       FUSION, SPINE, LUMBAR, ANTERIOR APPROACH - L2-5 PEDICLE SCREW - STAGE #2 (Back)      REMOVAL, HARDWARE - PEDICLE SCREW L2-3 (RTI) (Back)      LAMINECTOMY, SPINE, LUMBAR, WITH DISCECTOMY - REDO L4-5 (Back) Diagnosis:       Lumbar stenosis      Spondylosis      Lumbar radiculopathy      (lumbar stenosis spondylosis radiculopathy )    Surgeon:  Nestor Urbina M.D. Responsible Provider:  Gilles Samuel M.D.    Anesthesia Type:  general ASA Status:  2          Final Anesthesia Type: general  Last vitals  BP   Blood Pressure : 104/74, NIBP: 108/58    Temp   36.2 °C (97.2 °F)    Pulse   Pulse: (!) 109, Heart Rate (Monitored): (!) 108   Resp   17    SpO2   95 %      Anesthesia Post Evaluation    Patient location during evaluation: PACU  Patient participation: complete - patient participated  Level of consciousness: awake and alert  Pain score: 3    Airway patency: patent  Anesthetic complications: no  Cardiovascular status: hemodynamically stable  Respiratory status: acceptable  Hydration status: euvolemic    PONV: none           Nurse Pain Score: 3 (NPRS)

## 2019-07-05 NOTE — THERAPY
"Occupational Therapy Evaluation completed.   Functional Status:    Very pleasant 74 y/o female s/p L2-5 fusion. Pt has LSO to be worn whenever OOB, except for showers. Pt sitting up to chair when received by OT. She completed STS with supv, ambulates with personal 4WW steadily to bathroom. Supv for toilet xfer and toileting task. All ADL completed while moving within spinal precautions. On entry, pt was assisted with donning her brace - it appears to be a bit too small for patient, RN advised to call traction. Pt expressed concerns re: return to home, however significant time was taken to help patient problem solve through varius scenarios in the home. Educated on bed mobility, in/out bathtub, IADLs. Pt receptive to this information, and given that she is moving so well, anticipate that she will be capable D/Cing home, with HH. Will see patient 1-2 more times in this setting     Plan of Care: Plan to complete next treatment by 7/8 if pt remains in this setting  Discharge Recommendations:  Equipment: Will Continue to Assess for Equipment Needs. Post-acute therapy Recommend home health transitional care for continued occupational therapy services.       See \"Rehab Therapy-Acute\" Patient Summary Report for complete documentation.    "

## 2019-07-05 NOTE — PROGRESS NOTES
I was made aware that the patient would now like to try to go home with home health rather than SNF placement.   Order will be placed for home health.

## 2019-07-06 LAB
ANION GAP SERPL CALC-SCNC: 6 MMOL/L (ref 0–11.9)
BASOPHILS # BLD AUTO: 0.7 % (ref 0–1.8)
BASOPHILS # BLD: 0.05 K/UL (ref 0–0.12)
BUN SERPL-MCNC: 17 MG/DL (ref 8–22)
CALCIUM SERPL-MCNC: 8.5 MG/DL (ref 8.5–10.5)
CHLORIDE SERPL-SCNC: 101 MMOL/L (ref 96–112)
CO2 SERPL-SCNC: 28 MMOL/L (ref 20–33)
CREAT SERPL-MCNC: 1.06 MG/DL (ref 0.5–1.4)
EOSINOPHIL # BLD AUTO: 0.29 K/UL (ref 0–0.51)
EOSINOPHIL NFR BLD: 3.9 % (ref 0–6.9)
ERYTHROCYTE [DISTWIDTH] IN BLOOD BY AUTOMATED COUNT: 47.3 FL (ref 35.9–50)
GLUCOSE BLD-MCNC: 113 MG/DL (ref 65–99)
GLUCOSE BLD-MCNC: 133 MG/DL (ref 65–99)
GLUCOSE BLD-MCNC: 134 MG/DL (ref 65–99)
GLUCOSE BLD-MCNC: 141 MG/DL (ref 65–99)
GLUCOSE SERPL-MCNC: 140 MG/DL (ref 65–99)
HCT VFR BLD AUTO: 27.9 % (ref 37–47)
HGB BLD-MCNC: 8.7 G/DL (ref 12–16)
IMM GRANULOCYTES # BLD AUTO: 0.06 K/UL (ref 0–0.11)
IMM GRANULOCYTES NFR BLD AUTO: 0.8 % (ref 0–0.9)
LYMPHOCYTES # BLD AUTO: 2.73 K/UL (ref 1–4.8)
LYMPHOCYTES NFR BLD: 36.4 % (ref 22–41)
MCH RBC QN AUTO: 29.9 PG (ref 27–33)
MCHC RBC AUTO-ENTMCNC: 31.2 G/DL (ref 33.6–35)
MCV RBC AUTO: 95.9 FL (ref 81.4–97.8)
MONOCYTES # BLD AUTO: 0.61 K/UL (ref 0–0.85)
MONOCYTES NFR BLD AUTO: 8.1 % (ref 0–13.4)
NEUTROPHILS # BLD AUTO: 3.76 K/UL (ref 2–7.15)
NEUTROPHILS NFR BLD: 50.1 % (ref 44–72)
NRBC # BLD AUTO: 0 K/UL
NRBC BLD-RTO: 0 /100 WBC
PLATELET # BLD AUTO: 172 K/UL (ref 164–446)
PMV BLD AUTO: 11 FL (ref 9–12.9)
POTASSIUM SERPL-SCNC: 4.2 MMOL/L (ref 3.6–5.5)
RBC # BLD AUTO: 2.91 M/UL (ref 4.2–5.4)
SODIUM SERPL-SCNC: 135 MMOL/L (ref 135–145)
WBC # BLD AUTO: 7.5 K/UL (ref 4.8–10.8)

## 2019-07-06 PROCEDURE — 700102 HCHG RX REV CODE 250 W/ 637 OVERRIDE(OP): Performed by: PHYSICIAN ASSISTANT

## 2019-07-06 PROCEDURE — 36415 COLL VENOUS BLD VENIPUNCTURE: CPT

## 2019-07-06 PROCEDURE — 85025 COMPLETE CBC W/AUTO DIFF WBC: CPT

## 2019-07-06 PROCEDURE — 700112 HCHG RX REV CODE 229: Performed by: PHYSICIAN ASSISTANT

## 2019-07-06 PROCEDURE — 80048 BASIC METABOLIC PNL TOTAL CA: CPT

## 2019-07-06 PROCEDURE — 82962 GLUCOSE BLOOD TEST: CPT

## 2019-07-06 PROCEDURE — A9270 NON-COVERED ITEM OR SERVICE: HCPCS | Performed by: PHYSICIAN ASSISTANT

## 2019-07-06 PROCEDURE — 700111 HCHG RX REV CODE 636 W/ 250 OVERRIDE (IP): Performed by: PHYSICIAN ASSISTANT

## 2019-07-06 PROCEDURE — 770001 HCHG ROOM/CARE - MED/SURG/GYN PRIV*

## 2019-07-06 RX ADMIN — ENOXAPARIN SODIUM 30 MG: 100 INJECTION SUBCUTANEOUS at 17:13

## 2019-07-06 RX ADMIN — DOCUSATE SODIUM 100 MG: 100 CAPSULE, LIQUID FILLED ORAL at 04:16

## 2019-07-06 RX ADMIN — DIPHENHYDRAMINE HCL 25 MG: 25 TABLET ORAL at 21:20

## 2019-07-06 RX ADMIN — METHOCARBAMOL TABLETS 750 MG: 750 TABLET, COATED ORAL at 08:02

## 2019-07-06 RX ADMIN — KETOROLAC TROMETHAMINE 1 DROP: 5 SOLUTION/ DROPS OPHTHALMIC at 21:16

## 2019-07-06 RX ADMIN — ENOXAPARIN SODIUM 30 MG: 100 INJECTION SUBCUTANEOUS at 04:17

## 2019-07-06 RX ADMIN — ACETAMINOPHEN 650 MG: 325 TABLET, FILM COATED ORAL at 17:11

## 2019-07-06 RX ADMIN — METFORMIN HYDROCHLORIDE 500 MG: 500 TABLET, FILM COATED ORAL at 17:11

## 2019-07-06 RX ADMIN — DOCUSATE SODIUM 100 MG: 100 CAPSULE, LIQUID FILLED ORAL at 17:10

## 2019-07-06 RX ADMIN — OXYCODONE HYDROCHLORIDE 10 MG: 10 TABLET ORAL at 17:11

## 2019-07-06 RX ADMIN — TRAZODONE HYDROCHLORIDE 100 MG: 100 TABLET ORAL at 21:17

## 2019-07-06 RX ADMIN — LISINOPRIL 20 MG: 20 TABLET ORAL at 17:23

## 2019-07-06 RX ADMIN — ANTACID TABLETS 500 MG: 500 TABLET, CHEWABLE ORAL at 04:16

## 2019-07-06 RX ADMIN — OXYCODONE HYDROCHLORIDE 10 MG: 10 TABLET ORAL at 12:13

## 2019-07-06 RX ADMIN — GABAPENTIN 300 MG: 300 CAPSULE ORAL at 04:17

## 2019-07-06 RX ADMIN — METFORMIN HYDROCHLORIDE 500 MG: 500 TABLET, FILM COATED ORAL at 08:02

## 2019-07-06 RX ADMIN — LEVOTHYROXINE SODIUM 75 MCG: 75 TABLET ORAL at 04:17

## 2019-07-06 RX ADMIN — SENNOSIDES, DOCUSATE SODIUM 1 TABLET: 50; 8.6 TABLET, FILM COATED ORAL at 21:16

## 2019-07-06 RX ADMIN — ACETAMINOPHEN 650 MG: 325 TABLET, FILM COATED ORAL at 12:13

## 2019-07-06 RX ADMIN — MAGNESIUM HYDROXIDE 30 ML: 400 SUSPENSION ORAL at 17:23

## 2019-07-06 RX ADMIN — KETOROLAC TROMETHAMINE 1 DROP: 5 SOLUTION/ DROPS OPHTHALMIC at 08:01

## 2019-07-06 RX ADMIN — ATORVASTATIN CALCIUM 40 MG: 40 TABLET, FILM COATED ORAL at 17:10

## 2019-07-06 RX ADMIN — GABAPENTIN 300 MG: 300 CAPSULE ORAL at 12:13

## 2019-07-06 RX ADMIN — KETOROLAC TROMETHAMINE 15 MG: 30 INJECTION, SOLUTION INTRAMUSCULAR at 04:17

## 2019-07-06 RX ADMIN — DULOXETINE HYDROCHLORIDE 60 MG: 60 CAPSULE, DELAYED RELEASE ORAL at 17:12

## 2019-07-06 RX ADMIN — DULOXETINE HYDROCHLORIDE 60 MG: 60 CAPSULE, DELAYED RELEASE ORAL at 04:17

## 2019-07-06 RX ADMIN — OXYCODONE HYDROCHLORIDE 10 MG: 10 TABLET ORAL at 20:59

## 2019-07-06 RX ADMIN — GABAPENTIN 300 MG: 300 CAPSULE ORAL at 17:10

## 2019-07-06 RX ADMIN — ACETAMINOPHEN 650 MG: 325 TABLET, FILM COATED ORAL at 04:17

## 2019-07-06 RX ADMIN — KETOROLAC TROMETHAMINE 1 DROP: 5 SOLUTION/ DROPS OPHTHALMIC at 17:25

## 2019-07-06 RX ADMIN — ANTACID TABLETS 500 MG: 500 TABLET, CHEWABLE ORAL at 12:20

## 2019-07-06 RX ADMIN — KETOROLAC TROMETHAMINE 1 DROP: 5 SOLUTION/ DROPS OPHTHALMIC at 12:18

## 2019-07-06 RX ADMIN — OXYCODONE HYDROCHLORIDE 10 MG: 10 TABLET ORAL at 08:02

## 2019-07-06 NOTE — CARE PLAN
Problem: Communication  Goal: The ability to communicate needs accurately and effectively will improve  Outcome: PROGRESSING AS EXPECTED      Problem: Infection  Goal: Will remain free from infection  Outcome: PROGRESSING AS EXPECTED      Problem: Pain Management  Goal: Pain level will decrease to patient's comfort goal  Outcome: PROGRESSING AS EXPECTED

## 2019-07-06 NOTE — PROGRESS NOTES
Nurses ambulation note     Surgery patient?: Yes  Date of surgery: 7/4/19  Ambulated 50 ft on day 1 of surgery? Yes   Number of times ambulated 50 feet or greater today: 4  Patient has been up to chair, edge of bed or HOB 90 degrees for all meals?:YES   Goal met? Yes

## 2019-07-06 NOTE — OP REPORT
DATE OF SERVICE:  07/04/2019    PREOPERATIVE DIAGNOSES:  1.  L4-L5 radiculopathies.  2.  Neurogenic claudication.  3.  L3-L5 lumbar stenosis, bilateral recess stenosis.  4.  Status post stage I L3-L4 and L4-L5 XLIF.    POSTOPERATIVE DIAGNOSES:  1.  L4-L5 radiculopathies.  2.  Neurogenic claudication.  3.  L3-L5 lumbar stenosis, bilateral recess stenosis.  4.  Status post stage I L3-L4 and L4-L5 XLIF.    PROCEDURE:  Stage II of a planned 2-stage procedure for anterior, posterior,   circumferential decompression and fusion in 2 separate stages.  Stage II:  1.  Bilateral redo L3 laminotomies, foraminotomies, decompression of bilateral   L3 nerve roots.  2.  Bilateral redo L4 laminotomies, foraminotomies, decompression of bilateral   L4 nerve roots.  3.  Bilateral redo L5 laminotomies, foraminotomies, decompression of bilateral   L5 nerve roots.  4.  Left L4 transpedicular approach far lateral decompression left L4 nerve   root.  5.  Left L5 transpedicular approach far lateral decompression left L5 nerve   root.  6.  L3-L5 posterolateral arthrodesis with local morselized autograft and DBM   arthrodesis.  7.  Left L3-L5 RTI pedicle screw fixation, stabilization.  8.  Modifier-22 for extra degree of difficulty given the patient's dense   scarring and her body habitus with body mass index of 22 with comorbidities   consistent with morbid obesity, which added extra hour to the standard   surgical procedure.  9.  SSEP, EMG monitoring and intraoperative screw stimulation performed by   neuro monitoring associates, which remained stable throughout.    SURGEON:  Nestor Urbina MD, neurosurgery-spine surgery.    ASSISTANT:  Dyana Mireles PA-C    ANESTHESIA:  General endotracheal.    ANESTHESIOLOGIST:  Gilles Samuel MD    COMPLICATIONS:  None.    ESTIMATED BLOOD LOSS:  Less than 200 mL    PREOPERATIVE NOTE:  This is an extremely pleasant 73-year-old lady who   presents with mechanical low back pain and bilateral lower  extremity radicular   leg pain, weakness and paresthesia consistent with L4-L5 radiculopathies.    MRI showed L4-L5 and L5-S1 degenerative disk disease with marked collapse   below a prior L2-L3 fusion.  Given the patient's failure to improve with   conservative care and neurological deficits and her collapse, I offered the   patient staged procedures for indirect decompression of her lumbar spine   anteriorly and stabilizing this with posterior redo decompression and   instrumented stabilization.  I discussed surgical procedure, alternatives,   goals, risks, benefits, and complications in detail with patient, used a bone   model, MRI and educational handouts to assist the patient with her decision   making, answered all questions to the best of my ability.  Patient understood   and consents to surgery.    NARRATIVE DICTATION:  Patient was brought to the operating room and placed   under general endotracheal anesthesia.  She was placed prone on the operating   OSI table with care taken about the bony prominences, peripheral nerves.    Lumbar region was prepped and draped in the usual sterile fashion.  Following   localization with cross table fluoroscopy, a small incision was made from   L3-L5.  The dorsal elements from L3-L5 were exposed in a subperiosteal fashion   at the facets bilaterally.  Self-retaining retractors applied.    Intraoperative x-ray confirmed appropriate levels.  Once excellent exposure   was achieved, I used Jay Kehinde AM-8 drillbit, Kerrison rongeurs and curettes   to create bilateral redo L3 laminotomies and foraminotomies, decompression,   bilateral L3 nerve roots and then bilateral redo L4 laminotomies and   foraminotomies, decompression, bilateral L4 nerve roots and separately   bilateral redo L5 laminotomies and foraminotomies, decompressing bilateral L5   nerve roots.  There was some worst significant stenosis in the left side.    Hence, I drilled down the left L4 pedicle for  transpedicular decompression of   left L4 nerve root.  I drilled down the left L5 pedicle for transpedicular   decompression of left L5 nerve root.  This required extra degree of expertise,   effort and time, hence a modifier-59 is required for separate and distinct   procedures for CPT code 79407-33358.  The edges of bone were bone waxed.    Thrombin Gelfoam placed in epidural gutters for hemostasis.  After extensive   decompression was achieved, I decorticated the transverse process of L3-L5 and   packed local morselized autograft and DBM for posterolateral arthrodesis.  I   then placed the left-sided pedicle screws in L3, L4, L5 pedicles using   standard anatomical landmarks and PediGuard to accurately cannulate the   screws.  All screws had excellent purchase.  I stimulated the screws.  They   all stimulated over 20 millivolts.  A precontoured cecile was placed over the   polyaxial screw heads, locking screws applied and secured and final tightened.    AP and lateral x-rays confirmed excellent position of the cages and the   screws with excellent restored lordosis.  Wound was irrigated.  Hemostasis   achieved.  The wound was closed over a drain brought through a separate   incision with 0 Vicryl deep fascia, 2-0 Vicryl deep dermal layer, and   Steri-Strips to skin.  Small sterile dressing was applied.  Swabs, needles,   and instruments correct by 2 count.  No complications were encountered.    Patient tolerated procedure, stable and transferred to recovery room.  The   patient will be observed at [facility] for several days   until she meets discharge criteria.    INTRAOPERATIVE FINDINGS:  Severe stenosis at L3-L4 and L4-L5, which is freed   up as described.  No complications were encountered.  Modifier-22 is required   for extra degree of difficulty given the patient's body habitus and her   comorbidities, diabetes and hypertension consistent with morbid obesity adding   an extra hour to the  standard surgical procedure.    Patient will follow up at [facility] in 2   weeks and 6 weeks' time as arranged.       ____________________________________     MD CHRIS KNIGHT / JEFFRY    DD:  07/06/2019 15:36:58  DT:  07/06/2019 16:11:04    D#:  [id number]  Job#:  [id number]    cc: Mary EGAN

## 2019-07-06 NOTE — PROGRESS NOTES
Hemovac removed per order.  Wound drained for 20 minutes, applied pressure, bleeding stopped, dressing changed.

## 2019-07-06 NOTE — PROGRESS NOTES
Neurosurgery Progress Note    Subjective:  Postop day#4 L3-5 XLIF  POD #2 redo L4/5 lami with L3-5 fusion   C/o back pain   No leg pain  Minimal mobility   Initially, she was wanting to go to SNF, however, with further thought, she would like D/C home with home health.     Exam:  Motor 5/5 bilateral lower extremities  Dressing clean dry and intact      BP  Min: 100/57  Max: 110/83  Pulse  Av.3  Min: 95  Max: 100  Resp  Avg: 15.5  Min: 14  Max: 16  Temp  Av.4 °C (97.5 °F)  Min: 36.3 °C (97.4 °F)  Max: 36.4 °C (97.6 °F)  SpO2  Av %  Min: 91 %  Max: 94 %    No Data Recorded    Recent Labs      19   0345   WBC  8.6  7.1  7.5   RBC  3.70*  2.87*  2.91*   HEMOGLOBIN  11.4*  8.8*  8.7*   HEMATOCRIT  35.5*  28.0*  27.9*   MCV  95.9  97.6  95.9   MCH  30.8  30.7  29.9   MCHC  32.1*  31.4*  31.2*   RDW  47.6  47.7  47.3   PLATELETCT  178  145*  172   MPV  10.6  10.7  11.0     Recent Labs      19   0345   SODIUM  133*  133*  135   POTASSIUM  4.7  4.3  4.2   CHLORIDE  99  99  101   CO2  27  28  28   GLUCOSE  148*  218*  140*   BUN  19  16  17   CREATININE  1.19  0.99  1.06   CALCIUM  8.6  8.2*  8.5               Intake/Output       19 - 19 - 19 Total  Total       Intake    P.O.  --  -- --  280  -- 280    P.O. -- -- -- 280 -- 280    Total Intake -- -- -- 280 -- 280       Output    Urine  --  -- --  --  -- --    Number of Times Voided 1 x 2 x 3 x -- -- --    Drains  70  15 85  --  -- --    Output (mL) ([REMOVED] Closed/Suction Drain 1 Posterior Back) 70 15 85 -- -- --    Stool  --  -- --  --  -- --    Number of Times Stooled 1 x -- 1 x 1 x -- 1 x    Total Output 70 15 85 -- -- --       Net I/O     -70 -15 -85 280 -- 280            Intake/Output Summary (Last 24 hours) at 19 1100  Last data filed at 19 0900   Gross per 24 hour   Intake               280 ml   Output               85 ml   Net              195 ml            • methocarbamol  750 mg 4X/DAY PRN   • Pharmacy Consult Request  1 Each PHARMACY TO DOSE   • NS   Continuous   • enoxaparin  30 mg Q12HRS   • Respiratory Care per Protocol   Continuous RT   • ketorolac  1 Drop 4X/DAY   • NS   Continuous   • atorvastatin  40 mg Nightly   • DULoxetine  60 mg BID   • gabapentin  300 mg TID   • levothyroxine  75 mcg AM ES   • lisinopril  20 mg Q EVENING   • metFORMIN  500 mg BID WITH MEALS   • traZODone  100 mg Nightly   • Pharmacy Consult Request  1 Each PHARMACY TO DOSE   • docusate sodium  100 mg BID   • senna-docusate  1 Tab Nightly   • senna-docusate  1 Tab Q24HRS PRN   • polyethylene glycol/lytes  1 Packet BID PRN   • magnesium hydroxide  30 mL QDAY PRN   • bisacodyl  10 mg Q24HRS PRN   • fleet  1 Each Once PRN   • acetaminophen  650 mg Q6HRS   • oxyCODONE immediate-release  5 mg Q3HRS PRN   • oxyCODONE immediate release  10 mg Q3HRS PRN   • HYDROmorphone  0.5-1 mg Q3HRS PRN   • diphenhydrAMINE  25 mg Q6HRS PRN    Or   • diphenhydrAMINE  25 mg Q6HRS PRN   • ondansetron  4 mg Q4HRS PRN   • ondansetron  4 mg Q4HRS PRN   • ALPRAZolam  0.25 mg BID PRN   • labetalol  10 mg Q HOUR PRN   • hydrALAZINE  10 mg Q HOUR PRN   • benzocaine-menthol  1 Lozenge Q2HRS PRN   • calcium carbonate  500 mg BID   • insulin regular  2-9 Units 4X/DAY ACHS    And   • glucose  16 g Q15 MIN PRN    And   • dextrose 10% bolus  250 mL Q15 MIN PRN   • artificial tears  1 Application Q2HRS PRN       Assessment and Plan:  POD #4/2  PT/OT  Mobilize   Consult for home health has been placed.  PT/OT  Likely home tomorrow or more likely Monday

## 2019-07-06 NOTE — OP REPORT
DATE OF SERVICE:  07/02/2019    PREOPERATIVE DIAGNOSES:  1.  L4-L5 radiculopathies.  2.  Neurogenic claudication.  3.  L3-L4 and L4-L5 degenerative disk disease.  4.  L3-L4 and L4-L5 foraminal stenosis.  5.  Back pain refractory to medical care.    POSTOPERATIVE DIAGNOSES:  1.  L4-L5 radiculopathies.  2.  Neurogenic claudication.  3.  L3-L4 and L4-L5 degenerative disk disease.  4.  L3-L4 and L4-L5 foraminal stenosis.  5.  Back pain refractory to medical care.    PROCEDURES:  Stage I of a planned 2-stage procedure for anterior, posterior,   circumferential decompression and fusion in 2 separate stages.  Stage I:  1.  Minimally invasive left L3-5 XLIF.  2.  Partial corpectomy of L3.  3.  Partial corpectomy of L4.  4.  Partial corpectomy of L5.  5.  L3-L5 arthrodesis with 4WEB titanium cage arthrodesis with local   morselized autograft and DBM.  6.  Insertion of titanium cages at 2 separate levels.  7.  L3-L4 and L4-L5 lateral neural structures plating system with bicortical   screws.  8.  Modifier-22 for extra degree of difficulty given the patient's body   habitus and body mass index of 36 with comorbidities consistent with morbid   obesity and high iliac crest and redo nature of the surgery with previous   laminectomies, which all added an extra hour to the standard surgical   procedure.  9.  SSEPs, EMG monitoring performed by neuro monitoring associates, which   remained stable throughout.    SURGEON:  Nestor Urbina MD, neurosurgery-spine surgery.    ASSISTANT:  Indigo Ramirez PA-C    ANESTHESIA:  General endotracheal anesthesia.    ANESTHESIOLOGIST:  Huey Cook MD    COMPLICATIONS:  None.    ESTIMATED BLOOD LOSS:  Less than 20 mL.    PREOPERATIVE NOTE:  This is extremely pleasant 73-year-old lady who presents   with mechanical low back pain and lower extremity radicular leg pain, weakness   and paresthesia consistent with L4-L5 radiculopathies.  An MRI showed L3-L4   and L4-L5 degenerative disk  disease with collapse and foraminal stenosis.    Patient underwent prior L2-L3 XLIF and did well from that.  She had a prior   laminectomies; however, she has got significant collapse at the levels L3-L4   and L4-L5.  Given the patient's failure to improve with conservative care, I   offered the patient the above listed procedure for indirect decompression of   the spine to reestablish lumbar lordosis and open up the foramen.  This will   be followed by stage 2 posterior decompression and stabilization.  I discussed   surgical procedure, alternatives, goals, risks, benefits, complications in   detail with the patient.  I used a bone model, MRI and educational handouts to   assist the patient with her decision making, answered all questions to the   best of my ability.  Patient understood and consented to surgery.  Details are   well contained in the office visit notes.    NARRATIVE DICTATION:  The patient was brought to the operating room and placed   under general endotracheal anesthesia.  She was placed in a true lateral   position with great care taken about the bony prominences, peripheral nerves.    The lateral flank region was prepped and draped in the usual sterile fashion.    Following localization with cross table fluoroscopy, a small incision was   made over the left iliac crest and I entered the left retroperitoneal space.    I used blunt dissection to identify the psoas muscle and then with continuous   EMG monitoring, I placed the first dilator through the psoas muscle in the   middle of the disk space at L4-L5.  This was all made very difficult given the   patient's high iliac crest and her body mass index of 36, which all added an   extra hour to the standard surgical procedure.  Hence, a modifier-22 is   required.     Once the first dilator was placed, I had continuous EMG monitoring and then   sequentially dilated with monitoring, identifying and mapping out the lumbar   plexus posteriorly.   Sequential dilation was achieved and then the Lattus   retractor system was then placed over the dilators and the blades were opened   up allowing excellent exposure at L4-L5 level.  The ALL retractor was placed   anteriorly.  The kali was placed in the disk space at L4-L5.  The blades were   opened up allowing excellent exposure at L4-L5.    I incised the disk at L4-L5, removed the disk from the endplates.  Disk spaces   were quite arthrodesed and collapsed and did not distract up well, however.    Hence, partial corpectomies of L4 and L5 were accomplished in standard fashion   by removing third of the vertebral bodies.  I released the annulus,   sequentially distracted further.  I sized up for the appropriate lordotic cage   to appropriate 22x50 mm lordotic cage with 6-degree lordosis was chosen,   packed with local morcellized autograft and DBM, delivered under distraction   between the body of L4-L5 for an excellent A plus fit.  A lateral 4-hole   NeuroStructures plate was secured over L4-L5 and bicortical screws placed with   excellent purchase.  AP and lateral x-ray confirmed excellent position of the   cage and the plate with excellent restored lordosis.  Wound was irrigated.    Hemostasis achieved.  The retractor was gently closed and withdrawn and the   wound closed in multiple layers.  I then moved up to the L3-L4 level.  Again,   a small incision was made and I entered the left retroperitoneal space,   identified the psoas muscle, placed the first dilator in the psoas with   continuous EMG monitoring.  Sequential dilation was achieved.  The Lattus   retractor plate system was placed and the blades opened up allowing excellent   exposure at L3-L4.  ALL retractor was placed anteriorly.  The kali was placed   into the disk space at L3-L4 posteriorly and excellent exposure was achieved.    I incised the disk at L3-L4, removed the disk from the endplates.  The disk   space was quite arthrodesed and hence  partial corpectomy of L3 was   accomplished.  I then chose the appropriate lordotic 4WEB cage.  The cage was   chosen, packed with DBM and then delivered under distraction between the body   of L3-L4 for an excellent A plus fit.  The lateral 4-hole NeuroStructures   plate was secured over L3-L4 with bicortical screws at L3-L4.  Locking   mechanism was engaged.  AP and lateral x-ray confirmed excellent position of   the cage and plates with good bicortical purchase.  Wound was irrigated and   closed in multiple layers as the prior incision with 0 Vicryl deep fascia, 2-0   Vicryl deep dermal layer, Steri-Strips to skin.  Small sterile dressing was   applied.  Swabs, needles, instruments correct by 2 count.  No complications   were encountered.  Patient tolerated the procedure, stable and transferred to   recovery room.  Patient will be observed at [facility]   until she has stage-2 procedure in 2 days and then she will be observed until   she meets our discharge criteria.  Patient will follow up at [facility] in 2   weeks' and 6 weeks' time as arranged.       ____________________________________     MD CHRIS KNIGHT / NTS    DD:  07/06/2019 15:24:22  DT:  07/06/2019 15:47:45    D#:  [id number]  Job#:  [id number]    cc: Mary EGAN

## 2019-07-07 LAB
GLUCOSE BLD-MCNC: 118 MG/DL (ref 65–99)
GLUCOSE BLD-MCNC: 126 MG/DL (ref 65–99)
GLUCOSE BLD-MCNC: 137 MG/DL (ref 65–99)
GLUCOSE BLD-MCNC: 137 MG/DL (ref 65–99)

## 2019-07-07 PROCEDURE — A9270 NON-COVERED ITEM OR SERVICE: HCPCS | Performed by: PHYSICIAN ASSISTANT

## 2019-07-07 PROCEDURE — 770001 HCHG ROOM/CARE - MED/SURG/GYN PRIV*

## 2019-07-07 PROCEDURE — 700102 HCHG RX REV CODE 250 W/ 637 OVERRIDE(OP): Performed by: PHYSICIAN ASSISTANT

## 2019-07-07 PROCEDURE — 700112 HCHG RX REV CODE 229: Performed by: PHYSICIAN ASSISTANT

## 2019-07-07 PROCEDURE — 700111 HCHG RX REV CODE 636 W/ 250 OVERRIDE (IP): Performed by: PHYSICIAN ASSISTANT

## 2019-07-07 PROCEDURE — 82962 GLUCOSE BLOOD TEST: CPT | Mod: 91

## 2019-07-07 RX ADMIN — DULOXETINE HYDROCHLORIDE 60 MG: 60 CAPSULE, DELAYED RELEASE ORAL at 05:53

## 2019-07-07 RX ADMIN — OXYCODONE HYDROCHLORIDE 10 MG: 10 TABLET ORAL at 09:04

## 2019-07-07 RX ADMIN — KETOROLAC TROMETHAMINE 1 DROP: 5 SOLUTION/ DROPS OPHTHALMIC at 21:34

## 2019-07-07 RX ADMIN — OXYCODONE HYDROCHLORIDE 10 MG: 10 TABLET ORAL at 05:55

## 2019-07-07 RX ADMIN — ATORVASTATIN CALCIUM 40 MG: 40 TABLET, FILM COATED ORAL at 16:42

## 2019-07-07 RX ADMIN — KETOROLAC TROMETHAMINE 1 DROP: 5 SOLUTION/ DROPS OPHTHALMIC at 09:04

## 2019-07-07 RX ADMIN — ANTACID TABLETS 500 MG: 500 TABLET, CHEWABLE ORAL at 16:43

## 2019-07-07 RX ADMIN — METFORMIN HYDROCHLORIDE 500 MG: 500 TABLET, FILM COATED ORAL at 17:30

## 2019-07-07 RX ADMIN — GABAPENTIN 300 MG: 300 CAPSULE ORAL at 05:54

## 2019-07-07 RX ADMIN — ANTACID TABLETS 500 MG: 500 TABLET, CHEWABLE ORAL at 09:04

## 2019-07-07 RX ADMIN — DOCUSATE SODIUM 100 MG: 100 CAPSULE, LIQUID FILLED ORAL at 18:00

## 2019-07-07 RX ADMIN — METHOCARBAMOL TABLETS 750 MG: 750 TABLET, COATED ORAL at 21:33

## 2019-07-07 RX ADMIN — DOCUSATE SODIUM 100 MG: 100 CAPSULE, LIQUID FILLED ORAL at 05:54

## 2019-07-07 RX ADMIN — ACETAMINOPHEN 650 MG: 325 TABLET, FILM COATED ORAL at 01:05

## 2019-07-07 RX ADMIN — GABAPENTIN 300 MG: 300 CAPSULE ORAL at 11:32

## 2019-07-07 RX ADMIN — GABAPENTIN 300 MG: 300 CAPSULE ORAL at 16:42

## 2019-07-07 RX ADMIN — OXYCODONE HYDROCHLORIDE 10 MG: 10 TABLET ORAL at 21:31

## 2019-07-07 RX ADMIN — TRAZODONE HYDROCHLORIDE 100 MG: 100 TABLET ORAL at 21:34

## 2019-07-07 RX ADMIN — METFORMIN HYDROCHLORIDE 500 MG: 500 TABLET, FILM COATED ORAL at 07:30

## 2019-07-07 RX ADMIN — OXYCODONE HYDROCHLORIDE 10 MG: 10 TABLET ORAL at 01:04

## 2019-07-07 RX ADMIN — ENOXAPARIN SODIUM 30 MG: 100 INJECTION SUBCUTANEOUS at 15:13

## 2019-07-07 RX ADMIN — LEVOTHYROXINE SODIUM 75 MCG: 75 TABLET ORAL at 05:55

## 2019-07-07 RX ADMIN — OXYCODONE HYDROCHLORIDE 10 MG: 10 TABLET ORAL at 18:17

## 2019-07-07 RX ADMIN — ENOXAPARIN SODIUM 30 MG: 100 INJECTION SUBCUTANEOUS at 05:54

## 2019-07-07 RX ADMIN — OXYCODONE HYDROCHLORIDE 10 MG: 10 TABLET ORAL at 15:12

## 2019-07-07 RX ADMIN — ACETAMINOPHEN 650 MG: 325 TABLET, FILM COATED ORAL at 05:53

## 2019-07-07 RX ADMIN — SENNOSIDES, DOCUSATE SODIUM 1 TABLET: 50; 8.6 TABLET, FILM COATED ORAL at 21:34

## 2019-07-07 RX ADMIN — ACETAMINOPHEN 650 MG: 325 TABLET, FILM COATED ORAL at 11:31

## 2019-07-07 RX ADMIN — DULOXETINE HYDROCHLORIDE 60 MG: 60 CAPSULE, DELAYED RELEASE ORAL at 16:42

## 2019-07-07 RX ADMIN — OXYCODONE HYDROCHLORIDE 10 MG: 10 TABLET ORAL at 12:06

## 2019-07-07 NOTE — PROGRESS NOTES
Nurses ambulation note     Surgery patient?: Yes  Date of surgery: 7/4/19  Ambulated 50 ft on day 2 of surgery? Yes   Number of times ambulated 50 feet or greater today: 4  Patient has been up to chair, edge of bed or HOB 90 degrees for all meals?:YES   Goal met? Yes

## 2019-07-07 NOTE — PROGRESS NOTES
Neurosurgery Progress Note    Subjective:  Postop day#5 L3-5 XLIF  POD #3 redo L4/5 lami with L3-5 fusion   No leg pain  Minimal mobility   Initially, she was wanting to go to SNF, however, with further thought, she would like D/C home with home health.   Pain around left incision site where dressing is    Exam:  Motor 5/5 bilateral lower extremities  Dressing clean dry and intact  Erythema and signs irritation where adhesive dressing is at left incision      BP  Min: 98/58  Max: 112/64  Pulse  Av.3  Min: 63  Max: 100  Resp  Av.5  Min: 16  Max: 17  Temp  Av.5 °C (97.7 °F)  Min: 36.3 °C (97.4 °F)  Max: 36.7 °C (98.1 °F)  SpO2  Av.3 %  Min: 91 %  Max: 96 %    No Data Recorded    Recent Labs      19   0345   WBC  7.1  7.5   RBC  2.87*  2.91*   HEMOGLOBIN  8.8*  8.7*   HEMATOCRIT  28.0*  27.9*   MCV  97.6  95.9   MCH  30.7  29.9   MCHC  31.4*  31.2*   RDW  47.7  47.3   PLATELETCT  145*  172   MPV  10.7  11.0     Recent Labs      19   0345   SODIUM  133*  135   POTASSIUM  4.3  4.2   CHLORIDE  99  101   CO2  28  28   GLUCOSE  218*  140*   BUN  16  17   CREATININE  0.99  1.06   CALCIUM  8.2*  8.5               Intake/Output       19 - 19 - 1959       Total 1900-0659 Total       Intake    P.O.  880  375 1255  --  -- --    P.O.  -- -- --    Total Intake  -- -- --       Output    Urine  --  -- --  --  -- --    Number of Times Voided -- 1 x 1 x -- -- --    Stool  --  -- --  --  -- --    Number of Times Stooled 1 x -- 1 x -- -- --    Total Output -- -- -- -- -- --       Net I/O      -- -- --            Intake/Output Summary (Last 24 hours) at 19 3295  Last data filed at 19 2116   Gross per 24 hour   Intake             1255 ml   Output                0 ml   Net             1255 ml            • methocarbamol  750 mg 4X/DAY PRN   • Pharmacy  Consult Request  1 Each PHARMACY TO DOSE   • NS   Continuous   • enoxaparin  30 mg Q12HRS   • Respiratory Care per Protocol   Continuous RT   • ketorolac  1 Drop 4X/DAY   • NS   Continuous   • atorvastatin  40 mg Nightly   • DULoxetine  60 mg BID   • gabapentin  300 mg TID   • levothyroxine  75 mcg AM ES   • lisinopril  20 mg Q EVENING   • metFORMIN  500 mg BID WITH MEALS   • traZODone  100 mg Nightly   • Pharmacy Consult Request  1 Each PHARMACY TO DOSE   • docusate sodium  100 mg BID   • senna-docusate  1 Tab Nightly   • senna-docusate  1 Tab Q24HRS PRN   • polyethylene glycol/lytes  1 Packet BID PRN   • magnesium hydroxide  30 mL QDAY PRN   • bisacodyl  10 mg Q24HRS PRN   • fleet  1 Each Once PRN   • acetaminophen  650 mg Q6HRS   • oxyCODONE immediate-release  5 mg Q3HRS PRN   • oxyCODONE immediate release  10 mg Q3HRS PRN   • HYDROmorphone  0.5-1 mg Q3HRS PRN   • diphenhydrAMINE  25 mg Q6HRS PRN    Or   • diphenhydrAMINE  25 mg Q6HRS PRN   • ondansetron  4 mg Q4HRS PRN   • ondansetron  4 mg Q4HRS PRN   • ALPRAZolam  0.25 mg BID PRN   • labetalol  10 mg Q HOUR PRN   • hydrALAZINE  10 mg Q HOUR PRN   • benzocaine-menthol  1 Lozenge Q2HRS PRN   • calcium carbonate  500 mg BID   • insulin regular  2-9 Units 4X/DAY ACHS    And   • glucose  16 g Q15 MIN PRN    And   • dextrose 10% bolus  250 mL Q15 MIN PRN   • artificial tears  1 Application Q2HRS PRN       Assessment and Plan:  POD #5/3  PT/OT  Mobilize   Consult for home health has been placed.  PT/OT  Replace left side dressing today  Home tomorrow

## 2019-07-08 LAB
ANION GAP SERPL CALC-SCNC: 8 MMOL/L (ref 0–11.9)
BASOPHILS # BLD AUTO: 0.6 % (ref 0–1.8)
BASOPHILS # BLD: 0.04 K/UL (ref 0–0.12)
BUN SERPL-MCNC: 17 MG/DL (ref 8–22)
CALCIUM SERPL-MCNC: 9.4 MG/DL (ref 8.5–10.5)
CHLORIDE SERPL-SCNC: 99 MMOL/L (ref 96–112)
CO2 SERPL-SCNC: 26 MMOL/L (ref 20–33)
CREAT SERPL-MCNC: 1 MG/DL (ref 0.5–1.4)
EOSINOPHIL # BLD AUTO: 0.19 K/UL (ref 0–0.51)
EOSINOPHIL NFR BLD: 2.9 % (ref 0–6.9)
ERYTHROCYTE [DISTWIDTH] IN BLOOD BY AUTOMATED COUNT: 46.5 FL (ref 35.9–50)
GLUCOSE BLD-MCNC: 154 MG/DL (ref 65–99)
GLUCOSE BLD-MCNC: 156 MG/DL (ref 65–99)
GLUCOSE BLD-MCNC: 159 MG/DL (ref 65–99)
GLUCOSE BLD-MCNC: 159 MG/DL (ref 65–99)
GLUCOSE SERPL-MCNC: 155 MG/DL (ref 65–99)
HCT VFR BLD AUTO: 27.2 % (ref 37–47)
HGB BLD-MCNC: 8.4 G/DL (ref 12–16)
IMM GRANULOCYTES # BLD AUTO: 0.09 K/UL (ref 0–0.11)
IMM GRANULOCYTES NFR BLD AUTO: 1.4 % (ref 0–0.9)
LYMPHOCYTES # BLD AUTO: 1.92 K/UL (ref 1–4.8)
LYMPHOCYTES NFR BLD: 29.3 % (ref 22–41)
MCH RBC QN AUTO: 29.6 PG (ref 27–33)
MCHC RBC AUTO-ENTMCNC: 30.9 G/DL (ref 33.6–35)
MCV RBC AUTO: 95.8 FL (ref 81.4–97.8)
MONOCYTES # BLD AUTO: 0.55 K/UL (ref 0–0.85)
MONOCYTES NFR BLD AUTO: 8.4 % (ref 0–13.4)
NEUTROPHILS # BLD AUTO: 3.77 K/UL (ref 2–7.15)
NEUTROPHILS NFR BLD: 57.4 % (ref 44–72)
NRBC # BLD AUTO: 0.04 K/UL
NRBC BLD-RTO: 0.6 /100 WBC
PLATELET # BLD AUTO: 212 K/UL (ref 164–446)
PMV BLD AUTO: 10.3 FL (ref 9–12.9)
POTASSIUM SERPL-SCNC: 4.6 MMOL/L (ref 3.6–5.5)
RBC # BLD AUTO: 2.84 M/UL (ref 4.2–5.4)
SODIUM SERPL-SCNC: 133 MMOL/L (ref 135–145)
WBC # BLD AUTO: 6.6 K/UL (ref 4.8–10.8)

## 2019-07-08 PROCEDURE — 80048 BASIC METABOLIC PNL TOTAL CA: CPT

## 2019-07-08 PROCEDURE — A9270 NON-COVERED ITEM OR SERVICE: HCPCS | Performed by: PHYSICIAN ASSISTANT

## 2019-07-08 PROCEDURE — 36415 COLL VENOUS BLD VENIPUNCTURE: CPT

## 2019-07-08 PROCEDURE — 700102 HCHG RX REV CODE 250 W/ 637 OVERRIDE(OP): Performed by: PHYSICIAN ASSISTANT

## 2019-07-08 PROCEDURE — 85025 COMPLETE CBC W/AUTO DIFF WBC: CPT

## 2019-07-08 PROCEDURE — 700111 HCHG RX REV CODE 636 W/ 250 OVERRIDE (IP): Performed by: PHYSICIAN ASSISTANT

## 2019-07-08 PROCEDURE — 770001 HCHG ROOM/CARE - MED/SURG/GYN PRIV*

## 2019-07-08 PROCEDURE — 97164 PT RE-EVAL EST PLAN CARE: CPT

## 2019-07-08 PROCEDURE — 82962 GLUCOSE BLOOD TEST: CPT

## 2019-07-08 PROCEDURE — 700112 HCHG RX REV CODE 229: Performed by: PHYSICIAN ASSISTANT

## 2019-07-08 PROCEDURE — 97535 SELF CARE MNGMENT TRAINING: CPT

## 2019-07-08 RX ORDER — OXYCODONE HYDROCHLORIDE 10 MG/1
10 TABLET ORAL EVERY 4 HOURS PRN
Qty: 84 TAB | Refills: 0 | Status: SHIPPED | OUTPATIENT
Start: 2019-07-08 | End: 2019-07-22

## 2019-07-08 RX ORDER — METHOCARBAMOL 750 MG/1
750 TABLET, FILM COATED ORAL 4 TIMES DAILY PRN
Qty: 80 TAB | Refills: 0 | Status: SHIPPED | OUTPATIENT
Start: 2019-07-08 | End: 2019-12-18

## 2019-07-08 RX ADMIN — OXYCODONE HYDROCHLORIDE 10 MG: 10 TABLET ORAL at 03:25

## 2019-07-08 RX ADMIN — TRAZODONE HYDROCHLORIDE 100 MG: 100 TABLET ORAL at 21:00

## 2019-07-08 RX ADMIN — SENNOSIDES, DOCUSATE SODIUM 1 TABLET: 50; 8.6 TABLET, FILM COATED ORAL at 21:02

## 2019-07-08 RX ADMIN — METFORMIN HYDROCHLORIDE 500 MG: 500 TABLET, FILM COATED ORAL at 07:49

## 2019-07-08 RX ADMIN — DOCUSATE SODIUM 100 MG: 100 CAPSULE, LIQUID FILLED ORAL at 05:41

## 2019-07-08 RX ADMIN — ANTACID TABLETS 500 MG: 500 TABLET, CHEWABLE ORAL at 17:15

## 2019-07-08 RX ADMIN — INSULIN HUMAN 2 UNITS: 100 INJECTION, SOLUTION PARENTERAL at 11:18

## 2019-07-08 RX ADMIN — GABAPENTIN 300 MG: 300 CAPSULE ORAL at 05:40

## 2019-07-08 RX ADMIN — GABAPENTIN 300 MG: 300 CAPSULE ORAL at 11:18

## 2019-07-08 RX ADMIN — LEVOTHYROXINE SODIUM 75 MCG: 75 TABLET ORAL at 05:41

## 2019-07-08 RX ADMIN — GABAPENTIN 300 MG: 300 CAPSULE ORAL at 17:15

## 2019-07-08 RX ADMIN — INSULIN HUMAN 2 UNITS: 100 INJECTION, SOLUTION PARENTERAL at 07:46

## 2019-07-08 RX ADMIN — ENOXAPARIN SODIUM 30 MG: 100 INJECTION SUBCUTANEOUS at 03:00

## 2019-07-08 RX ADMIN — OXYCODONE HYDROCHLORIDE 10 MG: 10 TABLET ORAL at 00:21

## 2019-07-08 RX ADMIN — DULOXETINE HYDROCHLORIDE 60 MG: 60 CAPSULE, DELAYED RELEASE ORAL at 17:15

## 2019-07-08 RX ADMIN — INSULIN HUMAN 2 UNITS: 100 INJECTION, SOLUTION PARENTERAL at 17:17

## 2019-07-08 RX ADMIN — METFORMIN HYDROCHLORIDE 500 MG: 500 TABLET, FILM COATED ORAL at 17:15

## 2019-07-08 RX ADMIN — DULOXETINE HYDROCHLORIDE 60 MG: 60 CAPSULE, DELAYED RELEASE ORAL at 05:41

## 2019-07-08 RX ADMIN — ALPRAZOLAM 0.25 MG: 0.25 TABLET ORAL at 03:29

## 2019-07-08 RX ADMIN — DOCUSATE SODIUM 100 MG: 100 CAPSULE, LIQUID FILLED ORAL at 17:15

## 2019-07-08 RX ADMIN — OXYCODONE HYDROCHLORIDE 10 MG: 10 TABLET ORAL at 07:49

## 2019-07-08 RX ADMIN — LISINOPRIL 20 MG: 20 TABLET ORAL at 17:21

## 2019-07-08 RX ADMIN — ATORVASTATIN CALCIUM 40 MG: 40 TABLET, FILM COATED ORAL at 17:15

## 2019-07-08 RX ADMIN — INSULIN HUMAN 2 UNITS: 100 INJECTION, SOLUTION PARENTERAL at 21:03

## 2019-07-08 RX ADMIN — POLYETHYLENE GLYCOL 3350 1 PACKET: 17 POWDER, FOR SOLUTION ORAL at 05:40

## 2019-07-08 ASSESSMENT — COGNITIVE AND FUNCTIONAL STATUS - GENERAL
MOVING TO AND FROM BED TO CHAIR: A LITTLE
DAILY ACTIVITIY SCORE: 21
MOVING FROM LYING ON BACK TO SITTING ON SIDE OF FLAT BED: A LITTLE
SUGGESTED CMS G CODE MODIFIER MOBILITY: CK
STANDING UP FROM CHAIR USING ARMS: A LITTLE
DRESSING REGULAR LOWER BODY CLOTHING: A LITTLE
MOBILITY SCORE: 18
SUGGESTED CMS G CODE MODIFIER DAILY ACTIVITY: CJ
TOILETING: A LITTLE
CLIMB 3 TO 5 STEPS WITH RAILING: A LITTLE
TURNING FROM BACK TO SIDE WHILE IN FLAT BAD: A LITTLE
WALKING IN HOSPITAL ROOM: A LITTLE
HELP NEEDED FOR BATHING: A LITTLE

## 2019-07-08 ASSESSMENT — GAIT ASSESSMENTS
DISTANCE (FEET): 75
DEVIATION: BRADYKINETIC;SHUFFLED GAIT
ASSISTIVE DEVICE: 4 WHEEL WALKER
GAIT LEVEL OF ASSIST: MINIMAL ASSIST

## 2019-07-08 NOTE — THERAPY
"Occupational Therapy Treatment completed with focus on ADLs, ADL transfers and patient education.  Functional Status:  Pt pleasant & motivated.  This am pt required an excessive amount of time to complete basic ADL's.  Pt was easily distracted, very poor attention span & often became distracted mid task.  Pt also noted to drift off to sleep mid task as well.  Pt was able to dress in her own clothes using LH reacher, however it took pt over 45 min to don her clothes. Pt able to don TLSO with supervision & V/Q's.   Daughter called & also stated pt was falling asleep mid sentence.  Pt transferred to bedside chair with SBA & extra time.    Plan of Care: Will benefit from Occupational Therapy 3 times per week  Discharge Recommendations:  Equipment Will Continue to Assess for Equipment Needs. Post-acute therapy Discharge to a transitional care facility for continued skilled therapy services.    Pt does NOT appear to be functioning at a level to D/C home alone today.  Pt lives alone & unclear if her SO can come stay with her.  After lengthy discussion with pt she feels it would be better for her to D/C to a Post Acute setting to regain her independence prior to D/C home.  Discussed OT concerns with Nsg.    See \"Rehab Therapy-Acute\" Patient Summary Report for complete documentation.   "

## 2019-07-08 NOTE — DISCHARGE PLANNING
Agency/Facility Name: [facility]  Spoke To: Tiara  Outcome: Referral sent to [facility] on 7/06.  Patient accepted. Ashley(RN CM) notified.

## 2019-07-08 NOTE — PROGRESS NOTES
Neurosurgery Progress Note    Subjective:  Postop day#6 L3-5 XLIF  POD #4 redo L4/5 lami with L3-5 fusion   No leg pain  Minimal mobility   Initially, she was wanting to go to SNF, however, with further thought, she would like D/C home with home health.   Pain around left incision site where dressing is  She is feeling a little unsteady on her feet and would like to be re-evaluated by PT prior to discharge    Exam:  Motor 5/5 bilateral lower extremities  Dressing clean dry and intact  Erythema and signs irritation where adhesive dressing is at left incision      BP  Min: 96/54  Max: 121/72  Pulse  Av.3  Min: 91  Max: 100  Resp  Av  Min: 16  Max: 18  Temp  Av.3 °C (97.4 °F)  Min: 36.1 °C (97 °F)  Max: 36.6 °C (97.8 °F)  SpO2  Av.5 %  Min: 90 %  Max: 93 %    No Data Recorded    Recent Labs      19   0345   WBC  7.5   RBC  2.91*   HEMOGLOBIN  8.7*   HEMATOCRIT  27.9*   MCV  95.9   MCH  29.9   MCHC  31.2*   RDW  47.3   PLATELETCT  172   MPV  11.0     Recent Labs      19   0345   SODIUM  135   POTASSIUM  4.2   CHLORIDE  101   CO2  28   GLUCOSE  140*   BUN  17   CREATININE  1.06   CALCIUM  8.5               Intake/Output       19 07 - 19 0659 19 07 - 19 0659      6438-1918 0521-1028 Total  4999-9810 Total       Intake    P.O.  480  690 1170  --  -- --    P.O.  -- -- --    Total Intake  -- -- --       Output    Urine  --  -- --  --  -- --    Number of Times Voided -- 3 x 3 x -- -- --    Total Output -- -- -- -- -- --       Net I/O      -- -- --            Intake/Output Summary (Last 24 hours) at 19 0807  Last data filed at 19 0100   Gross per 24 hour   Intake             1170 ml   Output                0 ml   Net             1170 ml            • methocarbamol  750 mg 4X/DAY PRN   • Pharmacy Consult Request  1 Each PHARMACY TO DOSE   • NS   Continuous   • enoxaparin  30 mg Q12HRS   • Respiratory Care per  Protocol   Continuous RT   • ketorolac  1 Drop 4X/DAY   • NS   Continuous   • atorvastatin  40 mg Nightly   • DULoxetine  60 mg BID   • gabapentin  300 mg TID   • levothyroxine  75 mcg AM ES   • lisinopril  20 mg Q EVENING   • metFORMIN  500 mg BID WITH MEALS   • traZODone  100 mg Nightly   • Pharmacy Consult Request  1 Each PHARMACY TO DOSE   • docusate sodium  100 mg BID   • senna-docusate  1 Tab Nightly   • senna-docusate  1 Tab Q24HRS PRN   • polyethylene glycol/lytes  1 Packet BID PRN   • magnesium hydroxide  30 mL QDAY PRN   • bisacodyl  10 mg Q24HRS PRN   • fleet  1 Each Once PRN   • oxyCODONE immediate-release  5 mg Q3HRS PRN   • oxyCODONE immediate release  10 mg Q3HRS PRN   • HYDROmorphone  0.5-1 mg Q3HRS PRN   • diphenhydrAMINE  25 mg Q6HRS PRN    Or   • diphenhydrAMINE  25 mg Q6HRS PRN   • ondansetron  4 mg Q4HRS PRN   • ondansetron  4 mg Q4HRS PRN   • ALPRAZolam  0.25 mg BID PRN   • labetalol  10 mg Q HOUR PRN   • hydrALAZINE  10 mg Q HOUR PRN   • benzocaine-menthol  1 Lozenge Q2HRS PRN   • calcium carbonate  500 mg BID   • insulin regular  2-9 Units 4X/DAY ACHS    And   • glucose  16 g Q15 MIN PRN    And   • dextrose 10% bolus  250 mL Q15 MIN PRN   • artificial tears  1 Application Q2HRS PRN       Assessment and Plan:  POD #6/4  PT/OT  Mobilize   Consult for home health has been placed.  PT/OT to re-eval this AM prior to discharge  Home if felt to be stable by PT  Recheck CBC this AM

## 2019-07-08 NOTE — PROGRESS NOTES
Physical therapy is now recommending skilled nursing placement.  An order is placed in this regard.

## 2019-07-08 NOTE — DISCHARGE PLANNING
Anticipated Discharge Disposition:   SNF    Action:   Patient with recommendations to go to SNF at time of discharge    Barriers to Discharge:   Pending acceptance to SNF    Plan:   Choice form reviewed and signed for SNF.  PT/OT notes recommend SNF after discharge.  Choice form faxed to [facility]. [place] is where the patient is requesting to transfer to at time of discharge

## 2019-07-08 NOTE — DISCHARGE PLANNING
Anticipated Discharge Disposition:   Home with [facility]    Action:   Patient accepted to [facility].  Representative with [facility] to visit with patient today.  Patient refused home health care services and states she needs to be in a rehab facility.  Attempts to explain that her level of function was too high to be approved for SNF/Rehab.    Barriers to Discharge:   Patient refused to use [facility]    Plan:   Reviewing PT notes, it is recommended that patient discharge to transitional care facility for continued skilled therapy services.  Choice to be reviewed with patient for SNF.

## 2019-07-08 NOTE — THERAPY
"Pt seen for PT re-eval at request of neuro PA and RN, pt was initally cleared from PT service on 7/5/19. Pt is s/p L3-5 XLIF with redo L4-5 lami and L3-5 fusion. Pt presents to acute PT with impairments in gait, activity tolerance and balance. Pt required repeated cues to execute task due to decreased attention to task and requiring extra time to complete task. Pt tolerated ambulation x75 ft with 4WW and Min A with intermittent cues for posture and spinal precautions. Pt will benefit from ongoing actue PT interventions to address present impairments.       Physical Therapy Re-Evaluation completed.   Bed Mobility:  Supine to Sit:  (in chair pre/post PT session)  Transfers: Sit to Stand: Contact Guard Assist (no physical assist required, heavy verbal cues to sequence)  Gait: Level Of Assist: Minimal Assist with 4-Wheel Walker     x75 ft  Plan of Care: Will benefit from Physical Therapy 5 times per week  Discharge Recommendations: Equipment: Will Continue to Assess for Equipment Needs.   Post-acute therapy: Recommend post acute placement to optimize return to independent PLOF and reduce risk for falls as pt has very limited social support upon DC home.       See \"Rehab Therapy-Acute\" Patient Summary Report for complete documentation.     "

## 2019-07-08 NOTE — PROGRESS NOTES
Pt A&Ox4, denies any numbness and tingling, pain is 6/10 (pain med given).    Pt refused bed alarm despite education, call light within reach, pt educated on importance of using the call light when she needs assistance, pt verbalized understanding.

## 2019-07-08 NOTE — DISCHARGE PLANNING
Received Choice form at 1500  Agency/Facility Name: [facility]  Referral sent per Choice form @ [phone]

## 2019-07-08 NOTE — PROGRESS NOTES
1233 - ALEXA Ramirez paged to notify her that per PT/OT, they are recommending SNF. Will also notify her that pt's hgb is 8.4 today and pt stated she has been having hallucinations since yesterday. Per ALEXA Ramirez, she will place appropriate orders in.    1245 - Left a message for SW to notify about SNF order.

## 2019-07-09 LAB
ANION GAP SERPL CALC-SCNC: 7 MMOL/L (ref 0–11.9)
BASOPHILS # BLD AUTO: 0.3 % (ref 0–1.8)
BASOPHILS # BLD: 0.02 K/UL (ref 0–0.12)
BUN SERPL-MCNC: 15 MG/DL (ref 8–22)
CALCIUM SERPL-MCNC: 9 MG/DL (ref 8.5–10.5)
CHLORIDE SERPL-SCNC: 99 MMOL/L (ref 96–112)
CO2 SERPL-SCNC: 30 MMOL/L (ref 20–33)
CREAT SERPL-MCNC: 0.85 MG/DL (ref 0.5–1.4)
EOSINOPHIL # BLD AUTO: 0.17 K/UL (ref 0–0.51)
EOSINOPHIL NFR BLD: 2.5 % (ref 0–6.9)
ERYTHROCYTE [DISTWIDTH] IN BLOOD BY AUTOMATED COUNT: 47 FL (ref 35.9–50)
GLUCOSE BLD-MCNC: 117 MG/DL (ref 65–99)
GLUCOSE BLD-MCNC: 121 MG/DL (ref 65–99)
GLUCOSE BLD-MCNC: 122 MG/DL (ref 65–99)
GLUCOSE BLD-MCNC: 151 MG/DL (ref 65–99)
GLUCOSE SERPL-MCNC: 135 MG/DL (ref 65–99)
HCT VFR BLD AUTO: 26.4 % (ref 37–47)
HGB BLD-MCNC: 8.2 G/DL (ref 12–16)
IMM GRANULOCYTES # BLD AUTO: 0.11 K/UL (ref 0–0.11)
IMM GRANULOCYTES NFR BLD AUTO: 1.6 % (ref 0–0.9)
LYMPHOCYTES # BLD AUTO: 1.55 K/UL (ref 1–4.8)
LYMPHOCYTES NFR BLD: 23.1 % (ref 22–41)
MCH RBC QN AUTO: 29.9 PG (ref 27–33)
MCHC RBC AUTO-ENTMCNC: 31.1 G/DL (ref 33.6–35)
MCV RBC AUTO: 96.4 FL (ref 81.4–97.8)
MONOCYTES # BLD AUTO: 0.55 K/UL (ref 0–0.85)
MONOCYTES NFR BLD AUTO: 8.2 % (ref 0–13.4)
NEUTROPHILS # BLD AUTO: 4.31 K/UL (ref 2–7.15)
NEUTROPHILS NFR BLD: 64.3 % (ref 44–72)
NRBC # BLD AUTO: 0.02 K/UL
NRBC BLD-RTO: 0.3 /100 WBC
PLATELET # BLD AUTO: 198 K/UL (ref 164–446)
PMV BLD AUTO: 9.6 FL (ref 9–12.9)
POTASSIUM SERPL-SCNC: 4.6 MMOL/L (ref 3.6–5.5)
RBC # BLD AUTO: 2.74 M/UL (ref 4.2–5.4)
SODIUM SERPL-SCNC: 136 MMOL/L (ref 135–145)
WBC # BLD AUTO: 6.7 K/UL (ref 4.8–10.8)

## 2019-07-09 PROCEDURE — 80048 BASIC METABOLIC PNL TOTAL CA: CPT

## 2019-07-09 PROCEDURE — 700112 HCHG RX REV CODE 229: Performed by: PHYSICIAN ASSISTANT

## 2019-07-09 PROCEDURE — 700102 HCHG RX REV CODE 250 W/ 637 OVERRIDE(OP): Performed by: PHYSICIAN ASSISTANT

## 2019-07-09 PROCEDURE — 770001 HCHG ROOM/CARE - MED/SURG/GYN PRIV*

## 2019-07-09 PROCEDURE — 36415 COLL VENOUS BLD VENIPUNCTURE: CPT

## 2019-07-09 PROCEDURE — 85025 COMPLETE CBC W/AUTO DIFF WBC: CPT

## 2019-07-09 PROCEDURE — A9270 NON-COVERED ITEM OR SERVICE: HCPCS | Performed by: PHYSICIAN ASSISTANT

## 2019-07-09 PROCEDURE — 700111 HCHG RX REV CODE 636 W/ 250 OVERRIDE (IP): Performed by: PHYSICIAN ASSISTANT

## 2019-07-09 PROCEDURE — 82962 GLUCOSE BLOOD TEST: CPT | Mod: 91

## 2019-07-09 RX ADMIN — DOCUSATE SODIUM 100 MG: 100 CAPSULE, LIQUID FILLED ORAL at 04:01

## 2019-07-09 RX ADMIN — LISINOPRIL 20 MG: 20 TABLET ORAL at 16:04

## 2019-07-09 RX ADMIN — GABAPENTIN 300 MG: 300 CAPSULE ORAL at 16:04

## 2019-07-09 RX ADMIN — DULOXETINE HYDROCHLORIDE 60 MG: 60 CAPSULE, DELAYED RELEASE ORAL at 16:05

## 2019-07-09 RX ADMIN — ENOXAPARIN SODIUM 30 MG: 100 INJECTION SUBCUTANEOUS at 03:49

## 2019-07-09 RX ADMIN — ATORVASTATIN CALCIUM 40 MG: 40 TABLET, FILM COATED ORAL at 16:05

## 2019-07-09 RX ADMIN — POLYETHYLENE GLYCOL 3350 1 PACKET: 17 POWDER, FOR SOLUTION ORAL at 03:49

## 2019-07-09 RX ADMIN — GABAPENTIN 300 MG: 300 CAPSULE ORAL at 11:28

## 2019-07-09 RX ADMIN — ENOXAPARIN SODIUM 30 MG: 100 INJECTION SUBCUTANEOUS at 16:03

## 2019-07-09 RX ADMIN — MAGNESIUM HYDROXIDE 30 ML: 400 SUSPENSION ORAL at 11:34

## 2019-07-09 RX ADMIN — DULOXETINE HYDROCHLORIDE 60 MG: 60 CAPSULE, DELAYED RELEASE ORAL at 04:01

## 2019-07-09 RX ADMIN — METHOCARBAMOL TABLETS 750 MG: 750 TABLET, COATED ORAL at 22:52

## 2019-07-09 RX ADMIN — ANTACID TABLETS 500 MG: 500 TABLET, CHEWABLE ORAL at 04:01

## 2019-07-09 RX ADMIN — BISACODYL 10 MG: 10 SUPPOSITORY RECTAL at 16:12

## 2019-07-09 RX ADMIN — DOCUSATE SODIUM 100 MG: 100 CAPSULE, LIQUID FILLED ORAL at 16:05

## 2019-07-09 RX ADMIN — METFORMIN HYDROCHLORIDE 500 MG: 500 TABLET, FILM COATED ORAL at 07:42

## 2019-07-09 RX ADMIN — METFORMIN HYDROCHLORIDE 500 MG: 500 TABLET, FILM COATED ORAL at 16:03

## 2019-07-09 RX ADMIN — LEVOTHYROXINE SODIUM 75 MCG: 75 TABLET ORAL at 04:02

## 2019-07-09 RX ADMIN — GABAPENTIN 300 MG: 300 CAPSULE ORAL at 04:01

## 2019-07-09 RX ADMIN — TRAZODONE HYDROCHLORIDE 100 MG: 100 TABLET ORAL at 21:33

## 2019-07-09 RX ADMIN — ANTACID TABLETS 500 MG: 500 TABLET, CHEWABLE ORAL at 16:03

## 2019-07-09 NOTE — PROGRESS NOTES
Neurosurgery Progress Note    Subjective:  Postop day#7 L3-5 XLIF  POD #5 redo L4/5 lami with L3-5 fusion   No leg pain  Minimal mobility, feeling unsteady on feet  Awaiting SNF Placement--hopefully today in [place].   Back soreness improving.  Re-evaluated by PT--SNF recommended.    Exam:  Motor 5/5 bilateral lower extremities  Dressing clean dry and intact  Erythema and signs irritation where adhesive dressing is at left incision  Sensory intact    BP  Min: 117/61  Max: 129/58  Pulse  Av  Min: 103  Max: 111  Resp  Av  Min: 18  Max: 18  Temp  Av.4 °C (97.6 °F)  Min: 36.1 °C (97 °F)  Max: 36.9 °C (98.5 °F)  SpO2  Av.7 %  Min: 90 %  Max: 91 %    No Data Recorded    Recent Labs      19   0837   WBC  6.6   RBC  2.84*   HEMOGLOBIN  8.4*   HEMATOCRIT  27.2*   MCV  95.8   MCH  29.6   MCHC  30.9*   RDW  46.5   PLATELETCT  212   MPV  10.3     Recent Labs      19   0837   SODIUM  133*   POTASSIUM  4.6   CHLORIDE  99   CO2  26   GLUCOSE  155*   BUN  17   CREATININE  1.00   CALCIUM  9.4               Intake/Output       19 - 1959 19 - 07/10/19 0659      1900-0659 Total 1900-0659 Total       Intake    P.O.  800  -- 800  --  -- --    P.O. 800 -- 800 -- -- --    Total Intake 800 -- 800 -- -- --       Output    Urine  --  -- --  --  -- --    Number of Times Voided 3 x 5 x 8 x -- -- --    Stool  --  -- --  --  -- --    Number of Times Stooled -- 0 x 0 x -- -- --    Total Output -- -- -- -- -- --       Net I/O     800 -- 800 -- -- --            Intake/Output Summary (Last 24 hours) at 19 0748  Last data filed at 19 1800   Gross per 24 hour   Intake              800 ml   Output                0 ml   Net              800 ml            • methocarbamol  750 mg 4X/DAY PRN   • Pharmacy Consult Request  1 Each PHARMACY TO DOSE   • NS   Continuous   • enoxaparin  30 mg Q12HRS   • Respiratory Care per Protocol   Continuous RT   • ketorolac  1  Drop 4X/DAY   • NS   Continuous   • atorvastatin  40 mg Nightly   • DULoxetine  60 mg BID   • gabapentin  300 mg TID   • levothyroxine  75 mcg AM ES   • lisinopril  20 mg Q EVENING   • metFORMIN  500 mg BID WITH MEALS   • traZODone  100 mg Nightly   • Pharmacy Consult Request  1 Each PHARMACY TO DOSE   • docusate sodium  100 mg BID   • senna-docusate  1 Tab Nightly   • senna-docusate  1 Tab Q24HRS PRN   • polyethylene glycol/lytes  1 Packet BID PRN   • magnesium hydroxide  30 mL QDAY PRN   • bisacodyl  10 mg Q24HRS PRN   • fleet  1 Each Once PRN   • oxyCODONE immediate-release  5 mg Q3HRS PRN   • oxyCODONE immediate release  10 mg Q3HRS PRN   • HYDROmorphone  0.5-1 mg Q3HRS PRN   • diphenhydrAMINE  25 mg Q6HRS PRN    Or   • diphenhydrAMINE  25 mg Q6HRS PRN   • ondansetron  4 mg Q4HRS PRN   • ondansetron  4 mg Q4HRS PRN   • ALPRAZolam  0.25 mg BID PRN   • labetalol  10 mg Q HOUR PRN   • hydrALAZINE  10 mg Q HOUR PRN   • benzocaine-menthol  1 Lozenge Q2HRS PRN   • calcium carbonate  500 mg BID   • insulin regular  2-9 Units 4X/DAY ACHS    And   • glucose  16 g Q15 MIN PRN    And   • dextrose 10% bolus  250 mL Q15 MIN PRN   • artificial tears  1 Application Q2HRS PRN       Assessment and Plan:  POD #7/5  PT/OT to continue to mobilize   SNF recommended and being sought in [place].  Leg pain better but pt still feels unsteady   Recheck CBC again this AM  OK to d/c to SNF when Bed available   F/u with [facility] in 8-10 days for wound check.

## 2019-07-09 NOTE — CARE PLAN
Problem: Infection  Goal: Will remain free from infection  Outcome: PROGRESSING AS EXPECTED  Patient educated regarding infection control including hand hygiene, personal cares. Patient educated regarding s/s of infection including pain, fever, heat and redness at incision site. Patient encouraged to discuss any concerns with RN or care team. Patient verbalized understanding.       Problem: Bowel/Gastric:  Goal: Normal bowel function is maintained or improved  Outcome: PROGRESSING SLOWER THAN EXPECTED   07/08/19 2100   OTHER   Last BM 07/03/19     Intervention: Educate patient and significant other/support system about diet, fluid intake, medications and activity to promote bowel function  Patient educated on ambulation promoting bowel function, increasing fluid intake, PRN laxatives and stool softeners, and constipation effects of opioid pain medications. Patient verbalized understanding.

## 2019-07-09 NOTE — DISCHARGE SUMMARY
DATE OF ADMISSION:  07/02/2019    DATE OF POSSIBLE DISCHARGE:  07/08/2019    ADMISSION DIAGNOSES:  1.  L4-5 radiculopathies.  2.  Neurogenic claudication.  3.  L3-4 and L4-5 degenerative disk disease.  4.  L3-4 and L4-5 foraminal stenosis.  5.  Back pain refractory to medical care.    DISCHARGE DIAGNOSES:  1.  L4-5 radiculopathies.  2.  Neurogenic claudication.  3.  L3-4 and L4-5 degenerative disk disease.  4.  L3-4 and L4-5 foraminal stenosis.  5.  Back pain refractory to medical care.  6.  Status post left L3-4, L4-5 far lateral interbody fusion on date of   admission followed by staged redo L3-5 laminotomies with left-sided unilateral   pedicle screw fixation with foraminotomies.    HOSPITAL COURSE:  This is a very pleasant 73-year-old female who presented   with mechanical low back pain and lower extremity radicular leg pain, weakness   and paresthesia consistent with L4-5 radiculopathies.  An MRI shows L3-4 and   L4-5 degenerative disk disease with collapse and foraminal stenosis.  Patient   had previously undergone L2-3 XLIF and did well from that.  She had prior   laminectomies, but she did develop significant collapse at the level of L3-4   and L4-5.  After much discussion and failure of nonoperative measures, the   patient elected to proceed with surgical intervention.  For details of the   surgery, please see Dr. Urbina's operative report.  Postoperatively, after   stage I, she did quite well, reporting back pain with persistent right lumbar   preoperative pain and paresthesias.  A CT scan was ordered that day and   demonstrated good position of instrumentation.  She underwent stage II of the   surgery on 07/04/2019 without complication.  Her leg pain had resolved.  She   did have persistent severe back pain and was mobilizing quite minimally.  She   was somewhat anemic and this was followed through the hospital stay.  At one   point, she was planning to go to skilled nursing facility, but then elected to    try to proceed with home health placement.  An order was placed in this   regard.  Today, her vital signs are stable.  We are rechecking her hemoglobin   this morning.  Last hemoglobin was 8.7.  She is going to be reevaluated by   physical therapy this a.m. to be certain she is appropriate for discharge to   home with home health.  She is neurologically intact.  She complains of back   pain without significant leg pain.    DISCHARGE INSTRUCTIONS:  Follow up at [facility] as previously arranged.    Wear LSO brace when upright and out of bed.  No bending, lifting, twisting.    Take pain medication as prescribed.  Ice the incision frequently.  Stool   softeners p.r.n.  Follow up with primary care physician for repeat lab studies   regarding anemia, which is likely dilutional.  Return to the ER with chest   pain, shortness of breath, fever, chills, leg or arm swelling.    DISCHARGE PRESCRIPTIONS:  Include oxycodone 10 mg 1 p.o. q.4 hours p.r.n.   severe pain, #84, 0 refills, 14-day supply; Robaxin 750 mg 1 p.o. 4 times a   day as needed for spasm.    The above represents a brief summary of this patient's hospital stay.  For   details, please see the medical chart.       ____________________________________     DONAL Davey / JEFFRY    DD:  07/08/2019 08:17:50  DT:  07/09/2019 00:30:09    D#:  [id number]  Job#:  [id number]    cc: Mary EGAN, [facility]

## 2019-07-09 NOTE — DISCHARGE SUMMARY
DATE OF ADMISSION:  07/02/2019    DATE OF POSSIBLE TRANSFER:  07/09/2019    ADMISSION DIAGNOSES:  1.  L4-L5 radiculopathy.  2.  Neurogenic claudication.  3.  L3-L4, L4-L5 degenerative disk disease.  4.  L3-L4, L4-L5 foraminal stenosis.  5.  Failure of conservative measures.    DISCHARGE DIAGNOSES:  1.  L4-L5 radiculopathy.  2.  Neurogenic claudication.  3.  L3-L4, L4-L5 degenerative disk disease.  4.  L3-L4, L4-L5 foraminal stenosis.  5.  Failure of conservative measures.  6.  Status post left L3-L5 far lateral interbody fusion followed by staged   bilateral L3-L5 laminotomy with L3-L5 posterolateral arthrodesis with local   morcellized autograft and DBM arthrodesis on 07/04.    HOSPITAL COURSE:  This is a very pleasant 73-year-old female who presented to   [facility] with mechanical low back pain and bilateral lower extremity   radicular leg pain, weakness and paresthesia consistent with L4-L5   radiculopathies.  MRI showed L4-L5 and L5-S1 degenerative disk disease and   marked collapsed below a prior L2-L3 fusion given failure to improve with   conservative care and neurologic deficits.  The patient was indicated for   above procedures.  On postop day #1, the patient reported low back pain with   persistent right lumbar radiculopathy.  Postoperative CT demonstrated good   placement of cages and plate.  Following the staged procedure, the patient   reported resolution of leg pain.  She did develop some anemia and CBC was   followed throughout her hospital stay.  We initially placed an order for   skilled nursing for the patient, then decided to preferred home health, an   order was placed in this regard.  Over the following days, she began to feel   as though she was declining and PT did reevaluate the patient yesterday and   indicated the patient was a better candidate for skilled nursing facility, an   order has been placed in this regard.  On the date of possible transfer, her   vital signs are stable, she is  afebrile.  Her hemoglobin is stable at 8.4 and   this should be followed.  Her incisions are clean, dry and intact.  We will   recheck a CBC this morning.    MEDICATIONS:  The patient will be sent out with a prescription for oxycodone   10 mg 1 p.o. q. 4 hours as needed for severe pain, #84, 0 refills, 14-day   supply.  Other transfer medications include gabapentin 300 mg t.i.d., Cymbalta   60 mg p.o. b.i.d., trazodone 100 mg p.o. at bedtime, Glucophage 500 mg p.o.   b.i.d., Lipitor 40 mg p.o. at bedtime, lisinopril 20 mg p.o. at bedtime,   Robaxin 750 mg p.o. q.i.d. p.r.n. spasm, Synthroid 75 mcg p.o. at bedtime.    The above represents a brief summary of the patient's hospital stay.  For   details, please see the medical chart.    TRANSFER INSTRUCTIONS.  Continue PT, OT daily.  Ice the incision frequently.    Stool softeners p.r.n.  May shower, no bathtub.  Return to [facility] in 1   week time for postoperative appointment.  Wear LSO brace when upright and out   of bed.       ____________________________________     DONAL Davey / JEFFRY    DD:  07/09/2019 08:16:28  DT:  07/09/2019 08:55:56    D#:  [id number]  Job#:  [id number]

## 2019-07-09 NOTE — DISCHARGE PLANNING
Agency/Facility Name: [facility]  Spoke To: Yaquelin  Outcome: Updated therapy notes faxed to [facility].    Agency/Facility Name: [facility]  Spoke To: Selma  Outcome: Referral resent per Selma's request. Updated therapy notes attached.

## 2019-07-09 NOTE — PROGRESS NOTES
Mobility Note     Surgery patient?: Yes  Date of surgery: 7/4/19  Ambulated 50 ft on day of surgery? (N/A if today is not date of surgery): N/A  Number of times ambulated 50 feet or greater today: 4  Patient has been up to chair, edge of bed or HOB 90 degrees for all meals?: Yes  Goal met? (goal is ambulating at least 50 feet 2 times on day shift, one time on night shift): Yes   If patient did not meet mobility goal, why?: N/A

## 2019-07-10 VITALS
SYSTOLIC BLOOD PRESSURE: 105 MMHG | TEMPERATURE: 98.2 F | BODY MASS INDEX: 37.64 KG/M2 | HEIGHT: 64 IN | WEIGHT: 220.46 LBS | OXYGEN SATURATION: 95 % | HEART RATE: 84 BPM | DIASTOLIC BLOOD PRESSURE: 51 MMHG | RESPIRATION RATE: 17 BRPM

## 2019-07-10 LAB
GLUCOSE BLD-MCNC: 119 MG/DL (ref 65–99)
GLUCOSE BLD-MCNC: 128 MG/DL (ref 65–99)

## 2019-07-10 PROCEDURE — A9270 NON-COVERED ITEM OR SERVICE: HCPCS | Performed by: PHYSICIAN ASSISTANT

## 2019-07-10 PROCEDURE — 700102 HCHG RX REV CODE 250 W/ 637 OVERRIDE(OP): Performed by: PHYSICIAN ASSISTANT

## 2019-07-10 PROCEDURE — 700111 HCHG RX REV CODE 636 W/ 250 OVERRIDE (IP): Performed by: PHYSICIAN ASSISTANT

## 2019-07-10 PROCEDURE — 700112 HCHG RX REV CODE 229: Performed by: PHYSICIAN ASSISTANT

## 2019-07-10 PROCEDURE — 82962 GLUCOSE BLOOD TEST: CPT

## 2019-07-10 RX ADMIN — OXYCODONE HYDROCHLORIDE 5 MG: 5 TABLET ORAL at 12:19

## 2019-07-10 RX ADMIN — ENOXAPARIN SODIUM 30 MG: 100 INJECTION SUBCUTANEOUS at 03:45

## 2019-07-10 RX ADMIN — DOCUSATE SODIUM 100 MG: 100 CAPSULE, LIQUID FILLED ORAL at 06:34

## 2019-07-10 RX ADMIN — LEVOTHYROXINE SODIUM 75 MCG: 75 TABLET ORAL at 06:33

## 2019-07-10 RX ADMIN — ANTACID TABLETS 500 MG: 500 TABLET, CHEWABLE ORAL at 06:34

## 2019-07-10 RX ADMIN — GABAPENTIN 300 MG: 300 CAPSULE ORAL at 06:33

## 2019-07-10 RX ADMIN — OXYCODONE HYDROCHLORIDE 5 MG: 5 TABLET ORAL at 08:35

## 2019-07-10 RX ADMIN — GABAPENTIN 300 MG: 300 CAPSULE ORAL at 11:52

## 2019-07-10 RX ADMIN — DULOXETINE HYDROCHLORIDE 60 MG: 60 CAPSULE, DELAYED RELEASE ORAL at 06:34

## 2019-07-10 RX ADMIN — OXYCODONE HYDROCHLORIDE 5 MG: 5 TABLET ORAL at 03:45

## 2019-07-10 RX ADMIN — METHOCARBAMOL TABLETS 750 MG: 750 TABLET, COATED ORAL at 11:52

## 2019-07-10 RX ADMIN — METFORMIN HYDROCHLORIDE 500 MG: 500 TABLET, FILM COATED ORAL at 08:32

## 2019-07-10 NOTE — DISCHARGE INSTRUCTIONS
Discharge Instructions    Discharged to other by [facility] with escort. Discharged via wheelchair, hospital escort: Yes.  Special equipment needed: Not Applicable    Be sure to schedule a follow-up appointment with your primary care doctor or any specialists as instructed.     Discharge Plan:   Influenza Vaccine Indication: Not indicated: Previously immunized this influenza season and > 8 years of age    I understand that a diet low in cholesterol, fat, and sodium is recommended for good health. Unless I have been given specific instructions below for another diet, I accept this instruction as my diet prescription.   Other diet: Diabetic    Special Instructions: None    · Is patient discharged on Warfarin / Coumadin?   No     Depression / Suicide Risk    As you are discharged from this [facility], it is important to learn how to keep safe from harming yourself.    Recognize the warning signs:  · Abrupt changes in personality, positive or negative- including increase in energy   · Giving away possessions  · Change in eating patterns- significant weight changes-  positive or negative  · Change in sleeping patterns- unable to sleep or sleeping all the time   · Unwillingness or inability to communicate  · Depression  · Unusual sadness, discouragement and loneliness  · Talk of wanting to die  · Neglect of personal appearance   · Rebelliousness- reckless behavior  · Withdrawal from people/activities they love  · Confusion- inability to concentrate     If you or a loved one observes any of these behaviors or has concerns about self-harm, here's what you can do:  · Talk about it- your feelings and reasons for harming yourself  · Remove any means that you might use to hurt yourself (examples: pills, rope, extension cords, firearm)  · Get professional help from the community (Mental Health, Substance Abuse, psychological counseling)  · Do not be alone:Call your Safe Contact- someone whom you trust who will be  there for you.  · Call your local CRISIS HOTLINE 947-4067 or 508-000-5433  · Call your local Children's Mobile Crisis Response Team [facility] [place] (827) 830-6363 or www.[employer]  · Call the toll free National Suicide Prevention Hotlines   · National Suicide Prevention Lifeline 881-579-IMWN (7923)  · National [employer] Network 800-SUICIDE (142-8069)

## 2019-07-10 NOTE — PROGRESS NOTES
RN MOBILITY NOTE    Surgery patient?: yes  Date of surgery: 07/02  Ambulated 50 ft on day of surgery? (N/A if today is not date of surgery): n/a  Number of times ambulated 50 feet or greater today: 6  Patient has been up to chair, edge of bed or HOB 90 degrees for all meals?: 1/3  Goal met? (goal is ambulating at least 50 feet 2 times on day shift, one time on night shift): yes  If patient did not meet mobility goal, why?:

## 2019-07-10 NOTE — DISCHARGE PLANNING
Anticipated Discharge Disposition:   [facility] Transition Rehab    Action:    Pt accepted to [facility].  Transport arranged for today 1230 with [facility] van.  Pt agreeable.  Verbal for Cobra obtained.    Verbal for Cobra obtained from DONAL Ramirez.    [facility] [phone]    DC packet given to bedside RN, La.      Barriers to Discharge:    None    Plan:    DC today.

## 2019-07-10 NOTE — THERAPY
Attempted to see pt for Occupational Therapy treatment today. SW/RN informed OT that pt is d/cing to [facility], transport in route. OT tx withheld. Recommend continued OT services in SNF. RN/SW informed and agreed.

## 2019-07-10 NOTE — DISCHARGE SUMMARY
ADDENDUM    DATE OF POSSIBLE TRANSFER:  07/10/2019    Please see transfer summary yesterday.  The patient has remained in the   hospital overnight awaiting placement in skilled nursing facility.  Overnight,   she has no new complaints.  She continues to deny leg pain.  She has   tolerable back pain.  PT does feel the patient is indicated for skilled   nursing facility placement.  Her hemoglobin has been in the 8, stable.  Her   heart rate and blood pressure are stable.  She has no known history of anemia.    I did instruct her to follow up with her primary care physician in this   regard.  Otherwise, discharge instructions are as previously dictated.  She   will follow up with [facility] Sherwood in 8-10 days time for postoperative care.    She is to continue daily PT and OT.  She is to ice the incision frequently,   stool softeners p.r.n., may shower, no bathtub, wear LSO brace when upright   and out of bed.  Again, for complete list of medications, please see transfer   summary yesterday.       ____________________________________     DONAL Davey / JEFFRY    DD:  07/10/2019 08:26:36  DT:  07/10/2019 08:34:05    D#:  [id number]  Job#:  [id number]

## 2019-07-10 NOTE — PROGRESS NOTES
Neurosurgery Progress Note    Subjective:  Postop day#8 L3-5 XLIF  POD #6 redo L4/5 lami with L3-5 fusion   No leg pain  Minimal mobility, feeling unsteady on feet  Awaiting SNF Placement--hopefully today in [place].   Back soreness improving.  Re-evaluated by PT--SNF recommended.  Hb has been trending in 8s but stable, HR stable, BP stable    Exam:  Motor 5/5 bilateral lower extremities  Dressing clean dry and intact  Sensory intact      BP  Min: 113/51  Max: 132/78  Pulse  Av.7  Min: 88  Max: 105  Resp  Av  Min: 18  Max: 18  Temp  Av.5 °C (97.7 °F)  Min: 36.2 °C (97.2 °F)  Max: 36.8 °C (98.2 °F)  SpO2  Av.7 %  Min: 94 %  Max: 96 %    No Data Recorded    Recent Labs      19   0837  19   0838   WBC  6.6  6.7   RBC  2.84*  2.74*   HEMOGLOBIN  8.4*  8.2*   HEMATOCRIT  27.2*  26.4*   MCV  95.8  96.4   MCH  29.6  29.9   MCHC  30.9*  31.1*   RDW  46.5  47.0   PLATELETCT  212  198   MPV  10.3  9.6     Recent Labs      19   0837  19   0838   SODIUM  133*  136   POTASSIUM  4.6  4.6   CHLORIDE  99  99   CO2  26  30   GLUCOSE  155*  135*   BUN  17  15   CREATININE  1.00  0.85   CALCIUM  9.4  9.0               Intake/Output       19 - 07/10/19 0659 07/10/19 0700 - 19 0659       Total  Total       Intake    Total Intake -- -- -- -- -- --       Output    Urine  --  -- --  --  -- --    Number of Times Voided 3 x 1 x 4 x -- -- --    Stool  --  -- --  --  -- --    Number of Times Stooled 2 x -- 2 x -- -- --    Total Output -- -- -- -- -- --       Net I/O     -- -- -- -- -- --          No intake or output data in the 24 hours ending 07/10/19 0819         • methocarbamol  750 mg 4X/DAY PRN   • Pharmacy Consult Request  1 Each PHARMACY TO DOSE   • NS   Continuous   • enoxaparin  30 mg Q12HRS   • Respiratory Care per Protocol   Continuous RT   • ketorolac  1 Drop 4X/DAY   • NS   Continuous   • atorvastatin  40 mg Nightly   •  DULoxetine  60 mg BID   • gabapentin  300 mg TID   • levothyroxine  75 mcg AM ES   • lisinopril  20 mg Q EVENING   • metFORMIN  500 mg BID WITH MEALS   • traZODone  100 mg Nightly   • Pharmacy Consult Request  1 Each PHARMACY TO DOSE   • docusate sodium  100 mg BID   • senna-docusate  1 Tab Nightly   • senna-docusate  1 Tab Q24HRS PRN   • polyethylene glycol/lytes  1 Packet BID PRN   • magnesium hydroxide  30 mL QDAY PRN   • bisacodyl  10 mg Q24HRS PRN   • fleet  1 Each Once PRN   • oxyCODONE immediate-release  5 mg Q3HRS PRN   • oxyCODONE immediate release  10 mg Q3HRS PRN   • HYDROmorphone  0.5-1 mg Q3HRS PRN   • diphenhydrAMINE  25 mg Q6HRS PRN    Or   • diphenhydrAMINE  25 mg Q6HRS PRN   • ondansetron  4 mg Q4HRS PRN   • ondansetron  4 mg Q4HRS PRN   • ALPRAZolam  0.25 mg BID PRN   • labetalol  10 mg Q HOUR PRN   • hydrALAZINE  10 mg Q HOUR PRN   • benzocaine-menthol  1 Lozenge Q2HRS PRN   • calcium carbonate  500 mg BID   • insulin regular  2-9 Units 4X/DAY ACHS    And   • glucose  16 g Q15 MIN PRN    And   • dextrose 10% bolus  250 mL Q15 MIN PRN   • artificial tears  1 Application Q2HRS PRN       Assessment and Plan:  POD #8/6  PT/OT to continue to mobilize   Hopeful transfer today  Leg pain better but pt still feels unsteady   OK to d/c to SNF when Bed available   F/u with [facility] in 8-10 days for wound check.

## 2019-07-10 NOTE — CARE PLAN
Problem: Communication  Goal: The ability to communicate needs accurately and effectively will improve  Outcome: PROGRESSING AS EXPECTED  Patient able to communicate needs to staff. Call light within reach, patient educated to call for assistance. Patient calls appropriately. Will continue to assess.    Problem: Safety  Goal: Will remain free from falls  Outcome: PROGRESSING AS EXPECTED  Patient educated to call for assistance. Precautions in place; Call light within reach. Bed locked and in lowest position. Treaded socks in place. Hourly rounding. Will continue to monitor.

## 2019-07-10 NOTE — DISCHARGE PLANNING
Received Transport Form @ 1000  Contacted [employer], however, per Sammi, there is not availability.    Agency/Facility Name: [facility]  Spoke to: Abhay  Transport is scheduled for 7/10 @ 1230 going to [facility] via [facility] transport. Oriana([place] [facility]) and Yaquelin([facility]) notified of transport time.

## 2019-07-10 NOTE — PROGRESS NOTES
2120 Pt A&Ox4. Pt denies numbness and tingling. Pain 3/10, pt repositioned. Bed alarm refused. Bed locked and in lowest position. Pt educated to call for assistance. Call light within reach. SCDs in use.

## 2019-08-15 NOTE — OR NURSING
incorrect dose charted for vancomycin powder during case, should have read 2000 mg not 2000g given as powder prior to close on surgical site. Unable to enter correct dosing as the medication had been cancelled per physician order.  Correct dose 2000mg.

## 2019-12-17 ENCOUNTER — HOSPITAL ENCOUNTER (OUTPATIENT)
Facility: MEDICAL CENTER | Age: 74
End: 2019-12-17
Attending: NEUROLOGICAL SURGERY
Payer: MEDICARE

## 2019-12-17 LAB
APPEARANCE UR: CLEAR
BACTERIA #/AREA URNS HPF: NEGATIVE /HPF
BILIRUB UR QL STRIP.AUTO: NEGATIVE
COLOR UR: YELLOW
EPI CELLS #/AREA URNS HPF: NEGATIVE /HPF
GLUCOSE UR STRIP.AUTO-MCNC: NEGATIVE MG/DL
HYALINE CASTS #/AREA URNS LPF: NORMAL /LPF
KETONES UR STRIP.AUTO-MCNC: ABNORMAL MG/DL
LEUKOCYTE ESTERASE UR QL STRIP.AUTO: NEGATIVE
MICRO URNS: ABNORMAL
NITRITE UR QL STRIP.AUTO: NEGATIVE
PH UR STRIP.AUTO: 7.5 [PH] (ref 5–8)
PROT UR QL STRIP: 30 MG/DL
RBC # URNS HPF: NORMAL /HPF
RBC UR QL AUTO: NEGATIVE
SP GR UR STRIP.AUTO: 1.02
UROBILINOGEN UR STRIP.AUTO-MCNC: 1 MG/DL
WBC #/AREA URNS HPF: NORMAL /HPF

## 2019-12-17 PROCEDURE — 81001 URINALYSIS AUTO W/SCOPE: CPT

## 2019-12-18 RX ORDER — CHLORAL HYDRATE 500 MG
1000 CAPSULE ORAL DAILY
COMMUNITY

## 2019-12-18 RX ORDER — LEVOTHYROXINE SODIUM 0.05 MG/1
50 TABLET ORAL
COMMUNITY

## 2019-12-21 ENCOUNTER — APPOINTMENT (OUTPATIENT)
Dept: RADIOLOGY | Facility: MEDICAL CENTER | Age: 74
DRG: 460 | End: 2019-12-21
Attending: NEUROLOGICAL SURGERY
Payer: MEDICARE

## 2019-12-21 ENCOUNTER — ANESTHESIA EVENT (OUTPATIENT)
Dept: SURGERY | Facility: MEDICAL CENTER | Age: 74
DRG: 460 | End: 2019-12-21
Payer: MEDICARE

## 2019-12-21 ENCOUNTER — ANESTHESIA (OUTPATIENT)
Dept: SURGERY | Facility: MEDICAL CENTER | Age: 74
DRG: 460 | End: 2019-12-21
Payer: MEDICARE

## 2019-12-21 ENCOUNTER — APPOINTMENT (OUTPATIENT)
Dept: RADIOLOGY | Facility: REHABILITATION | Age: 74
DRG: 460 | End: 2019-12-21
Attending: NEUROLOGICAL SURGERY
Payer: MEDICARE

## 2019-12-21 ENCOUNTER — HOSPITAL ENCOUNTER (INPATIENT)
Facility: MEDICAL CENTER | Age: 74
LOS: 5 days | DRG: 460 | End: 2019-12-26
Attending: NEUROLOGICAL SURGERY | Admitting: NEUROLOGICAL SURGERY
Payer: MEDICARE

## 2019-12-21 DIAGNOSIS — G89.18 POSTOPERATIVE PAIN AFTER SPINAL SURGERY: ICD-10-CM

## 2019-12-21 LAB — GLUCOSE BLD-MCNC: 138 MG/DL (ref 65–99)

## 2019-12-21 PROCEDURE — 160035 HCHG PACU - 1ST 60 MINS PHASE I: Performed by: NEUROLOGICAL SURGERY

## 2019-12-21 PROCEDURE — 500375 HCHG DRAIN, J-P ROUND 10FR: Performed by: NEUROLOGICAL SURGERY

## 2019-12-21 PROCEDURE — 700111 HCHG RX REV CODE 636 W/ 250 OVERRIDE (IP): Performed by: NEUROLOGICAL SURGERY

## 2019-12-21 PROCEDURE — 4A11X4G MONITORING OF PERIPHERAL NERVOUS ELECTRICAL ACTIVITY, INTRAOPERATIVE, EXTERNAL APPROACH: ICD-10-PCS | Performed by: NEUROLOGICAL SURGERY

## 2019-12-21 PROCEDURE — 770001 HCHG ROOM/CARE - MED/SURG/GYN PRIV*

## 2019-12-21 PROCEDURE — 700105 HCHG RX REV CODE 258: Performed by: NEUROLOGICAL SURGERY

## 2019-12-21 PROCEDURE — 700111 HCHG RX REV CODE 636 W/ 250 OVERRIDE (IP)

## 2019-12-21 PROCEDURE — 700111 HCHG RX REV CODE 636 W/ 250 OVERRIDE (IP): Performed by: PHYSICIAN ASSISTANT

## 2019-12-21 PROCEDURE — 500389 HCHG DRAIN, RESERVOIR SUCT JP 100CC: Performed by: NEUROLOGICAL SURGERY

## 2019-12-21 PROCEDURE — 0SP304Z REMOVAL OF INTERNAL FIXATION DEVICE FROM LUMBOSACRAL JOINT, OPEN APPROACH: ICD-10-PCS | Performed by: NEUROLOGICAL SURGERY

## 2019-12-21 PROCEDURE — 500885 HCHG PACK, JACKSON TABLE: Performed by: NEUROLOGICAL SURGERY

## 2019-12-21 PROCEDURE — 110454 HCHG SHELL REV 250: Performed by: NEUROLOGICAL SURGERY

## 2019-12-21 PROCEDURE — 95937 NEUROMUSCULAR JUNCTION TEST: CPT | Performed by: NEUROLOGICAL SURGERY

## 2019-12-21 PROCEDURE — 700111 HCHG RX REV CODE 636 W/ 250 OVERRIDE (IP): Performed by: ANESTHESIOLOGY

## 2019-12-21 PROCEDURE — 0SG3071 FUSION OF LUMBOSACRAL JOINT WITH AUTOLOGOUS TISSUE SUBSTITUTE, POSTERIOR APPROACH, POSTERIOR COLUMN, OPEN APPROACH: ICD-10-PCS | Performed by: NEUROLOGICAL SURGERY

## 2019-12-21 PROCEDURE — A6222 GAUZE <=16 IN NO W/SAL W/O B: HCPCS | Performed by: NEUROLOGICAL SURGERY

## 2019-12-21 PROCEDURE — A9270 NON-COVERED ITEM OR SERVICE: HCPCS | Performed by: PHYSICIAN ASSISTANT

## 2019-12-21 PROCEDURE — 160048 HCHG OR STATISTICAL LEVEL 1-5: Performed by: NEUROLOGICAL SURGERY

## 2019-12-21 PROCEDURE — 95860 NEEDLE EMG 1 EXTREMITY: CPT | Performed by: NEUROLOGICAL SURGERY

## 2019-12-21 PROCEDURE — 700102 HCHG RX REV CODE 250 W/ 637 OVERRIDE(OP): Performed by: ANESTHESIOLOGY

## 2019-12-21 PROCEDURE — A9270 NON-COVERED ITEM OR SERVICE: HCPCS | Performed by: ANESTHESIOLOGY

## 2019-12-21 PROCEDURE — A4314 CATH W/DRAINAGE 2-WAY LATEX: HCPCS | Performed by: NEUROLOGICAL SURGERY

## 2019-12-21 PROCEDURE — 700102 HCHG RX REV CODE 250 W/ 637 OVERRIDE(OP): Performed by: PHYSICIAN ASSISTANT

## 2019-12-21 PROCEDURE — 95940 IONM IN OPERATNG ROOM 15 MIN: CPT | Performed by: NEUROLOGICAL SURGERY

## 2019-12-21 PROCEDURE — 700101 HCHG RX REV CODE 250: Performed by: PHYSICIAN ASSISTANT

## 2019-12-21 PROCEDURE — 95955 EEG DURING SURGERY: CPT | Performed by: NEUROLOGICAL SURGERY

## 2019-12-21 PROCEDURE — 160042 HCHG SURGERY MINUTES - EA ADDL 1 MIN LEVEL 5: Performed by: NEUROLOGICAL SURGERY

## 2019-12-21 PROCEDURE — 500367 HCHG DRAIN KIT, HEMOVAC: Performed by: NEUROLOGICAL SURGERY

## 2019-12-21 PROCEDURE — 160009 HCHG ANES TIME/MIN: Performed by: NEUROLOGICAL SURGERY

## 2019-12-21 PROCEDURE — 502240 HCHG MISC OR SUPPLY RC 0272: Performed by: NEUROLOGICAL SURGERY

## 2019-12-21 PROCEDURE — 0SG1071 FUSION OF 2 OR MORE LUMBAR VERTEBRAL JOINTS WITH AUTOLOGOUS TISSUE SUBSTITUTE, POSTERIOR APPROACH, POSTERIOR COLUMN, OPEN APPROACH: ICD-10-PCS | Performed by: NEUROLOGICAL SURGERY

## 2019-12-21 PROCEDURE — 72100 X-RAY EXAM L-S SPINE 2/3 VWS: CPT

## 2019-12-21 PROCEDURE — 95925 SOMATOSENSORY TESTING: CPT | Performed by: NEUROLOGICAL SURGERY

## 2019-12-21 PROCEDURE — 700112 HCHG RX REV CODE 229: Performed by: PHYSICIAN ASSISTANT

## 2019-12-21 PROCEDURE — 160036 HCHG PACU - EA ADDL 30 MINS PHASE I: Performed by: NEUROLOGICAL SURGERY

## 2019-12-21 PROCEDURE — 160031 HCHG SURGERY MINUTES - 1ST 30 MINS LEVEL 5: Performed by: NEUROLOGICAL SURGERY

## 2019-12-21 PROCEDURE — 501838 HCHG SUTURE GENERAL: Performed by: NEUROLOGICAL SURGERY

## 2019-12-21 PROCEDURE — 82962 GLUCOSE BLOOD TEST: CPT

## 2019-12-21 PROCEDURE — 700101 HCHG RX REV CODE 250: Performed by: NEUROLOGICAL SURGERY

## 2019-12-21 PROCEDURE — 700105 HCHG RX REV CODE 258: Performed by: ANESTHESIOLOGY

## 2019-12-21 PROCEDURE — 700101 HCHG RX REV CODE 250: Performed by: ANESTHESIOLOGY

## 2019-12-21 PROCEDURE — 160002 HCHG RECOVERY MINUTES (STAT): Performed by: NEUROLOGICAL SURGERY

## 2019-12-21 PROCEDURE — 502000 HCHG MISC OR IMPLANTS RC 0278: Performed by: NEUROLOGICAL SURGERY

## 2019-12-21 RX ORDER — LIDOCAINE HYDROCHLORIDE 20 MG/ML
JELLY TOPICAL PRN
Status: DISCONTINUED | OUTPATIENT
Start: 2019-12-21 | End: 2019-12-21 | Stop reason: SURG

## 2019-12-21 RX ORDER — CEFAZOLIN SODIUM 1 G/3ML
INJECTION, POWDER, FOR SOLUTION INTRAMUSCULAR; INTRAVENOUS PRN
Status: DISCONTINUED | OUTPATIENT
Start: 2019-12-21 | End: 2019-12-21 | Stop reason: SURG

## 2019-12-21 RX ORDER — SUCCINYLCHOLINE CHLORIDE 20 MG/ML
INJECTION INTRAMUSCULAR; INTRAVENOUS PRN
Status: DISCONTINUED | OUTPATIENT
Start: 2019-12-21 | End: 2019-12-21 | Stop reason: SURG

## 2019-12-21 RX ORDER — OXYCODONE HYDROCHLORIDE 5 MG/1
5 TABLET ORAL EVERY 4 HOURS PRN
Status: DISCONTINUED | OUTPATIENT
Start: 2019-12-21 | End: 2019-12-26 | Stop reason: HOSPADM

## 2019-12-21 RX ORDER — ACETAMINOPHEN 325 MG/1
650 TABLET ORAL EVERY 6 HOURS PRN
Status: DISCONTINUED | OUTPATIENT
Start: 2019-12-21 | End: 2019-12-26 | Stop reason: HOSPADM

## 2019-12-21 RX ORDER — BISACODYL 10 MG
10 SUPPOSITORY, RECTAL RECTAL
Status: DISCONTINUED | OUTPATIENT
Start: 2019-12-21 | End: 2019-12-26 | Stop reason: HOSPADM

## 2019-12-21 RX ORDER — MORPHINE SULFATE 4 MG/ML
4 INJECTION, SOLUTION INTRAMUSCULAR; INTRAVENOUS
Status: DISCONTINUED | OUTPATIENT
Start: 2019-12-21 | End: 2019-12-26 | Stop reason: HOSPADM

## 2019-12-21 RX ORDER — ACETAMINOPHEN 500 MG
1000 TABLET ORAL ONCE
Status: COMPLETED | OUTPATIENT
Start: 2019-12-21 | End: 2019-12-21

## 2019-12-21 RX ORDER — LABETALOL HYDROCHLORIDE 5 MG/ML
5 INJECTION, SOLUTION INTRAVENOUS
Status: DISCONTINUED | OUTPATIENT
Start: 2019-12-21 | End: 2019-12-21 | Stop reason: HOSPADM

## 2019-12-21 RX ORDER — ONDANSETRON 2 MG/ML
INJECTION INTRAMUSCULAR; INTRAVENOUS PRN
Status: DISCONTINUED | OUTPATIENT
Start: 2019-12-21 | End: 2019-12-21 | Stop reason: SURG

## 2019-12-21 RX ORDER — LIDOCAINE HYDROCHLORIDE 10 MG/ML
INJECTION, SOLUTION EPIDURAL; INFILTRATION; INTRACAUDAL; PERINEURAL
Status: COMPLETED
Start: 2019-12-21 | End: 2019-12-21

## 2019-12-21 RX ORDER — ONDANSETRON 4 MG/1
4 TABLET, ORALLY DISINTEGRATING ORAL EVERY 4 HOURS PRN
Status: DISCONTINUED | OUTPATIENT
Start: 2019-12-21 | End: 2019-12-26 | Stop reason: HOSPADM

## 2019-12-21 RX ORDER — HYDROMORPHONE HYDROCHLORIDE 1 MG/ML
0.4 INJECTION, SOLUTION INTRAMUSCULAR; INTRAVENOUS; SUBCUTANEOUS
Status: DISCONTINUED | OUTPATIENT
Start: 2019-12-21 | End: 2019-12-21 | Stop reason: HOSPADM

## 2019-12-21 RX ORDER — HYDROMORPHONE HYDROCHLORIDE 1 MG/ML
0.1 INJECTION, SOLUTION INTRAMUSCULAR; INTRAVENOUS; SUBCUTANEOUS
Status: DISCONTINUED | OUTPATIENT
Start: 2019-12-21 | End: 2019-12-21 | Stop reason: HOSPADM

## 2019-12-21 RX ORDER — LIDOCAINE HYDROCHLORIDE 20 MG/ML
INJECTION, SOLUTION EPIDURAL; INFILTRATION; INTRACAUDAL; PERINEURAL PRN
Status: DISCONTINUED | OUTPATIENT
Start: 2019-12-21 | End: 2019-12-21 | Stop reason: SURG

## 2019-12-21 RX ORDER — DIPHENHYDRAMINE HCL 25 MG
25 TABLET ORAL EVERY 6 HOURS PRN
Status: DISCONTINUED | OUTPATIENT
Start: 2019-12-21 | End: 2019-12-26 | Stop reason: HOSPADM

## 2019-12-21 RX ORDER — OXYCODONE HYDROCHLORIDE 5 MG/1
10 TABLET ORAL EVERY 4 HOURS PRN
Status: DISCONTINUED | OUTPATIENT
Start: 2019-12-21 | End: 2019-12-26 | Stop reason: HOSPADM

## 2019-12-21 RX ORDER — BACITRACIN 50000 [IU]/1
INJECTION, POWDER, FOR SOLUTION INTRAMUSCULAR
Status: DISCONTINUED | OUTPATIENT
Start: 2019-12-21 | End: 2019-12-21 | Stop reason: HOSPADM

## 2019-12-21 RX ORDER — HYDROCODONE BITARTRATE AND ACETAMINOPHEN 10; 325 MG/1; MG/1
1 TABLET ORAL EVERY 4 HOURS PRN
Status: DISCONTINUED | OUTPATIENT
Start: 2019-12-21 | End: 2019-12-26 | Stop reason: HOSPADM

## 2019-12-21 RX ORDER — KETAMINE HYDROCHLORIDE 50 MG/ML
INJECTION, SOLUTION INTRAMUSCULAR; INTRAVENOUS PRN
Status: DISCONTINUED | OUTPATIENT
Start: 2019-12-21 | End: 2019-12-21 | Stop reason: SURG

## 2019-12-21 RX ORDER — MEPERIDINE HYDROCHLORIDE 25 MG/ML
12.5 INJECTION INTRAMUSCULAR; INTRAVENOUS; SUBCUTANEOUS
Status: DISCONTINUED | OUTPATIENT
Start: 2019-12-21 | End: 2019-12-21 | Stop reason: HOSPADM

## 2019-12-21 RX ORDER — CALCIUM CARBONATE 500 MG/1
500 TABLET, CHEWABLE ORAL 2 TIMES DAILY
Status: DISCONTINUED | OUTPATIENT
Start: 2019-12-21 | End: 2019-12-26 | Stop reason: HOSPADM

## 2019-12-21 RX ORDER — LEVOTHYROXINE SODIUM 0.05 MG/1
50 TABLET ORAL
Status: DISCONTINUED | OUTPATIENT
Start: 2019-12-21 | End: 2019-12-26 | Stop reason: HOSPADM

## 2019-12-21 RX ORDER — ENEMA 19; 7 G/133ML; G/133ML
1 ENEMA RECTAL
Status: DISCONTINUED | OUTPATIENT
Start: 2019-12-21 | End: 2019-12-26 | Stop reason: HOSPADM

## 2019-12-21 RX ORDER — LIDOCAINE HYDROCHLORIDE 40 MG/ML
SOLUTION TOPICAL PRN
Status: DISCONTINUED | OUTPATIENT
Start: 2019-12-21 | End: 2019-12-21 | Stop reason: SURG

## 2019-12-21 RX ORDER — FAMOTIDINE 20 MG/1
10 TABLET, FILM COATED ORAL 2 TIMES DAILY
Status: DISCONTINUED | OUTPATIENT
Start: 2019-12-21 | End: 2019-12-26 | Stop reason: HOSPADM

## 2019-12-21 RX ORDER — GABAPENTIN 300 MG/1
300 CAPSULE ORAL 3 TIMES DAILY
Status: DISCONTINUED | OUTPATIENT
Start: 2019-12-21 | End: 2019-12-26 | Stop reason: HOSPADM

## 2019-12-21 RX ORDER — ROCURONIUM BROMIDE 10 MG/ML
INJECTION, SOLUTION INTRAVENOUS PRN
Status: DISCONTINUED | OUTPATIENT
Start: 2019-12-21 | End: 2019-12-21 | Stop reason: SURG

## 2019-12-21 RX ORDER — DEXAMETHASONE SODIUM PHOSPHATE 4 MG/ML
INJECTION, SOLUTION INTRA-ARTICULAR; INTRALESIONAL; INTRAMUSCULAR; INTRAVENOUS; SOFT TISSUE PRN
Status: DISCONTINUED | OUTPATIENT
Start: 2019-12-21 | End: 2019-12-21 | Stop reason: SURG

## 2019-12-21 RX ORDER — ONDANSETRON 2 MG/ML
4 INJECTION INTRAMUSCULAR; INTRAVENOUS
Status: COMPLETED | OUTPATIENT
Start: 2019-12-21 | End: 2019-12-21

## 2019-12-21 RX ORDER — OXYCODONE HCL 5 MG/5 ML
5 SOLUTION, ORAL ORAL
Status: COMPLETED | OUTPATIENT
Start: 2019-12-21 | End: 2019-12-21

## 2019-12-21 RX ORDER — DIPHENHYDRAMINE HYDROCHLORIDE 50 MG/ML
12.5 INJECTION INTRAMUSCULAR; INTRAVENOUS
Status: DISCONTINUED | OUTPATIENT
Start: 2019-12-21 | End: 2019-12-21 | Stop reason: HOSPADM

## 2019-12-21 RX ORDER — BUPIVACAINE HYDROCHLORIDE AND EPINEPHRINE 5; 5 MG/ML; UG/ML
INJECTION, SOLUTION PERINEURAL
Status: DISCONTINUED | OUTPATIENT
Start: 2019-12-21 | End: 2019-12-21 | Stop reason: HOSPADM

## 2019-12-21 RX ORDER — SODIUM CHLORIDE, SODIUM LACTATE, POTASSIUM CHLORIDE, CALCIUM CHLORIDE 600; 310; 30; 20 MG/100ML; MG/100ML; MG/100ML; MG/100ML
INJECTION, SOLUTION INTRAVENOUS CONTINUOUS
Status: DISCONTINUED | OUTPATIENT
Start: 2019-12-21 | End: 2019-12-21 | Stop reason: HOSPADM

## 2019-12-21 RX ORDER — SODIUM CHLORIDE AND POTASSIUM CHLORIDE 150; 900 MG/100ML; MG/100ML
INJECTION, SOLUTION INTRAVENOUS CONTINUOUS
Status: DISCONTINUED | OUTPATIENT
Start: 2019-12-21 | End: 2019-12-26 | Stop reason: HOSPADM

## 2019-12-21 RX ORDER — VANCOMYCIN HYDROCHLORIDE 1 G/20ML
INJECTION, POWDER, LYOPHILIZED, FOR SOLUTION INTRAVENOUS
Status: COMPLETED | OUTPATIENT
Start: 2019-12-21 | End: 2019-12-21

## 2019-12-21 RX ORDER — AMOXICILLIN 250 MG
1 CAPSULE ORAL NIGHTLY
Status: DISCONTINUED | OUTPATIENT
Start: 2019-12-21 | End: 2019-12-26 | Stop reason: HOSPADM

## 2019-12-21 RX ORDER — OXYCODONE HCL 5 MG/5 ML
10 SOLUTION, ORAL ORAL
Status: COMPLETED | OUTPATIENT
Start: 2019-12-21 | End: 2019-12-21

## 2019-12-21 RX ORDER — CHLORAL HYDRATE 500 MG
1000 CAPSULE ORAL DAILY
Status: DISCONTINUED | OUTPATIENT
Start: 2019-12-21 | End: 2019-12-26 | Stop reason: HOSPADM

## 2019-12-21 RX ORDER — TRAZODONE HYDROCHLORIDE 50 MG/1
50 TABLET ORAL NIGHTLY
Status: DISCONTINUED | OUTPATIENT
Start: 2019-12-21 | End: 2019-12-26 | Stop reason: HOSPADM

## 2019-12-21 RX ORDER — LABETALOL HYDROCHLORIDE 5 MG/ML
10 INJECTION, SOLUTION INTRAVENOUS
Status: DISCONTINUED | OUTPATIENT
Start: 2019-12-21 | End: 2019-12-26 | Stop reason: HOSPADM

## 2019-12-21 RX ORDER — DOXYCYCLINE 100 MG/1
100 TABLET ORAL 2 TIMES DAILY
Status: DISCONTINUED | OUTPATIENT
Start: 2019-12-23 | End: 2019-12-26 | Stop reason: HOSPADM

## 2019-12-21 RX ORDER — DULOXETIN HYDROCHLORIDE 60 MG/1
60 CAPSULE, DELAYED RELEASE ORAL 2 TIMES DAILY
Status: DISCONTINUED | OUTPATIENT
Start: 2019-12-21 | End: 2019-12-26 | Stop reason: HOSPADM

## 2019-12-21 RX ORDER — ATORVASTATIN CALCIUM 40 MG/1
40 TABLET, FILM COATED ORAL NIGHTLY
Status: DISCONTINUED | OUTPATIENT
Start: 2019-12-21 | End: 2019-12-26 | Stop reason: HOSPADM

## 2019-12-21 RX ORDER — HALOPERIDOL 5 MG/ML
1 INJECTION INTRAMUSCULAR
Status: DISCONTINUED | OUTPATIENT
Start: 2019-12-21 | End: 2019-12-21 | Stop reason: HOSPADM

## 2019-12-21 RX ORDER — ALPRAZOLAM 0.25 MG/1
0.25 TABLET ORAL 3 TIMES DAILY PRN
Status: DISCONTINUED | OUTPATIENT
Start: 2019-12-21 | End: 2019-12-26 | Stop reason: HOSPADM

## 2019-12-21 RX ORDER — ONDANSETRON 2 MG/ML
4 INJECTION INTRAMUSCULAR; INTRAVENOUS EVERY 4 HOURS PRN
Status: DISCONTINUED | OUTPATIENT
Start: 2019-12-21 | End: 2019-12-26 | Stop reason: HOSPADM

## 2019-12-21 RX ORDER — HYDROMORPHONE HYDROCHLORIDE 1 MG/ML
0.2 INJECTION, SOLUTION INTRAMUSCULAR; INTRAVENOUS; SUBCUTANEOUS
Status: DISCONTINUED | OUTPATIENT
Start: 2019-12-21 | End: 2019-12-21 | Stop reason: HOSPADM

## 2019-12-21 RX ORDER — METHOCARBAMOL 750 MG/1
750 TABLET, FILM COATED ORAL 4 TIMES DAILY PRN
Status: DISCONTINUED | OUTPATIENT
Start: 2019-12-21 | End: 2019-12-26 | Stop reason: HOSPADM

## 2019-12-21 RX ORDER — DOCUSATE SODIUM 100 MG/1
100 CAPSULE, LIQUID FILLED ORAL 2 TIMES DAILY
Status: DISCONTINUED | OUTPATIENT
Start: 2019-12-21 | End: 2019-12-26 | Stop reason: HOSPADM

## 2019-12-21 RX ORDER — AMOXICILLIN 250 MG
1 CAPSULE ORAL
Status: DISCONTINUED | OUTPATIENT
Start: 2019-12-21 | End: 2019-12-26 | Stop reason: HOSPADM

## 2019-12-21 RX ORDER — DIPHENHYDRAMINE HYDROCHLORIDE 50 MG/ML
25 INJECTION INTRAMUSCULAR; INTRAVENOUS EVERY 6 HOURS PRN
Status: DISCONTINUED | OUTPATIENT
Start: 2019-12-21 | End: 2019-12-26 | Stop reason: HOSPADM

## 2019-12-21 RX ORDER — CEFAZOLIN SODIUM 2 G/100ML
2 INJECTION, SOLUTION INTRAVENOUS EVERY 8 HOURS
Status: DISPENSED | OUTPATIENT
Start: 2019-12-21 | End: 2019-12-23

## 2019-12-21 RX ORDER — GABAPENTIN 300 MG/1
300 CAPSULE ORAL ONCE
Status: COMPLETED | OUTPATIENT
Start: 2019-12-21 | End: 2019-12-21

## 2019-12-21 RX ORDER — POLYETHYLENE GLYCOL 3350 17 G/17G
1 POWDER, FOR SOLUTION ORAL DAILY
Status: DISCONTINUED | OUTPATIENT
Start: 2019-12-21 | End: 2019-12-26 | Stop reason: HOSPADM

## 2019-12-21 RX ORDER — SODIUM CHLORIDE, SODIUM LACTATE, POTASSIUM CHLORIDE, CALCIUM CHLORIDE 600; 310; 30; 20 MG/100ML; MG/100ML; MG/100ML; MG/100ML
INJECTION, SOLUTION INTRAVENOUS CONTINUOUS
Status: ACTIVE | OUTPATIENT
Start: 2019-12-21 | End: 2019-12-21

## 2019-12-21 RX ORDER — LISINOPRIL 20 MG/1
20 TABLET ORAL EVERY EVENING
Status: DISCONTINUED | OUTPATIENT
Start: 2019-12-21 | End: 2019-12-26 | Stop reason: HOSPADM

## 2019-12-21 RX ADMIN — Medication 0.5 ML: at 06:58

## 2019-12-21 RX ADMIN — TRAZODONE HYDROCHLORIDE 50 MG: 50 TABLET ORAL at 20:30

## 2019-12-21 RX ADMIN — PROPOFOL 100 MG: 10 INJECTION, EMULSION INTRAVENOUS at 07:44

## 2019-12-21 RX ADMIN — DOCUSATE SODIUM 100 MG: 100 CAPSULE, LIQUID FILLED ORAL at 17:20

## 2019-12-21 RX ADMIN — SODIUM CHLORIDE, POTASSIUM CHLORIDE, SODIUM LACTATE AND CALCIUM CHLORIDE: 600; 310; 30; 20 INJECTION, SOLUTION INTRAVENOUS at 09:09

## 2019-12-21 RX ADMIN — SUCCINYLCHOLINE CHLORIDE 200 MG: 20 INJECTION, SOLUTION INTRAMUSCULAR; INTRAVENOUS at 07:44

## 2019-12-21 RX ADMIN — KETAMINE HYDROCHLORIDE 25 MG: 50 INJECTION INTRAMUSCULAR; INTRAVENOUS at 07:42

## 2019-12-21 RX ADMIN — GABAPENTIN 300 MG: 300 CAPSULE ORAL at 17:19

## 2019-12-21 RX ADMIN — ANTACID TABLETS 500 MG: 500 TABLET, CHEWABLE ORAL at 18:00

## 2019-12-21 RX ADMIN — FENTANYL CITRATE 150 MCG: 50 INJECTION, SOLUTION INTRAMUSCULAR; INTRAVENOUS at 08:22

## 2019-12-21 RX ADMIN — FENTANYL CITRATE 100 MCG: 50 INJECTION, SOLUTION INTRAMUSCULAR; INTRAVENOUS at 07:43

## 2019-12-21 RX ADMIN — SENNOSIDES AND DOCUSATE SODIUM 1 TABLET: 8.6; 5 TABLET ORAL at 20:30

## 2019-12-21 RX ADMIN — CEFAZOLIN 2 G: 330 INJECTION, POWDER, FOR SOLUTION INTRAMUSCULAR; INTRAVENOUS at 07:38

## 2019-12-21 RX ADMIN — ATORVASTATIN CALCIUM 40 MG: 40 TABLET, FILM COATED ORAL at 20:30

## 2019-12-21 RX ADMIN — DIPHENHYDRAMINE HYDROCHLORIDE 25 MG: 25 TABLET ORAL at 18:41

## 2019-12-21 RX ADMIN — SODIUM CHLORIDE, POTASSIUM CHLORIDE, SODIUM LACTATE AND CALCIUM CHLORIDE: 600; 310; 30; 20 INJECTION, SOLUTION INTRAVENOUS at 06:58

## 2019-12-21 RX ADMIN — HYDROMORPHONE HYDROCHLORIDE 0.4 MG: 1 INJECTION, SOLUTION INTRAMUSCULAR; INTRAVENOUS; SUBCUTANEOUS at 12:40

## 2019-12-21 RX ADMIN — EPHEDRINE SULFATE 25 MG: 50 INJECTION, SOLUTION INTRAVENOUS at 08:29

## 2019-12-21 RX ADMIN — LIDOCAINE HYDROCHLORIDE 4 ML: 40 SOLUTION TOPICAL at 07:44

## 2019-12-21 RX ADMIN — SUFENTANIL CITRATE 50 MCG: 50 INJECTION EPIDURAL; INTRAVENOUS at 07:44

## 2019-12-21 RX ADMIN — LISINOPRIL 20 MG: 20 TABLET ORAL at 17:24

## 2019-12-21 RX ADMIN — HYDROMORPHONE HYDROCHLORIDE 0.4 MG: 1 INJECTION, SOLUTION INTRAMUSCULAR; INTRAVENOUS; SUBCUTANEOUS at 12:35

## 2019-12-21 RX ADMIN — DEXAMETHASONE SODIUM PHOSPHATE 4 MG: 4 INJECTION, SOLUTION INTRA-ARTICULAR; INTRALESIONAL; INTRAMUSCULAR; INTRAVENOUS; SOFT TISSUE at 08:05

## 2019-12-21 RX ADMIN — GABAPENTIN 300 MG: 300 CAPSULE ORAL at 07:20

## 2019-12-21 RX ADMIN — FENTANYL CITRATE 50 MCG: 0.05 INJECTION, SOLUTION INTRAMUSCULAR; INTRAVENOUS at 11:57

## 2019-12-21 RX ADMIN — LIDOCAINE HYDROCHLORIDE 4 ML: 20 JELLY TOPICAL at 07:44

## 2019-12-21 RX ADMIN — POLYETHYLENE GLYCOL 3350 1 PACKET: 17 POWDER, FOR SOLUTION ORAL at 17:24

## 2019-12-21 RX ADMIN — OXYCODONE HYDROCHLORIDE 10 MG: 5 SOLUTION ORAL at 11:57

## 2019-12-21 RX ADMIN — SODIUM CHLORIDE 1000 MCG/KG/HR: 9 INJECTION, SOLUTION INTRAVENOUS at 07:42

## 2019-12-21 RX ADMIN — PROPOFOL 40 MCG/KG/MIN: 10 INJECTION, EMULSION INTRAVENOUS at 07:42

## 2019-12-21 RX ADMIN — ONDANSETRON 4 MG: 2 INJECTION INTRAMUSCULAR; INTRAVENOUS at 08:05

## 2019-12-21 RX ADMIN — LIDOCAINE HYDROCHLORIDE 5 ML: 20 INJECTION, SOLUTION EPIDURAL; INFILTRATION; INTRACAUDAL at 07:44

## 2019-12-21 RX ADMIN — LIDOCAINE HYDROCHLORIDE 0.5 ML: 10 INJECTION, SOLUTION EPIDURAL; INFILTRATION; INTRACAUDAL at 06:58

## 2019-12-21 RX ADMIN — HYDROMORPHONE HYDROCHLORIDE 0.2 MG: 1 INJECTION, SOLUTION INTRAMUSCULAR; INTRAVENOUS; SUBCUTANEOUS at 12:45

## 2019-12-21 RX ADMIN — DULOXETINE HYDROCHLORIDE 60 MG: 60 CAPSULE, DELAYED RELEASE ORAL at 17:23

## 2019-12-21 RX ADMIN — ROCURONIUM BROMIDE 5 MG: 10 INJECTION, SOLUTION INTRAVENOUS at 07:44

## 2019-12-21 RX ADMIN — PROPOFOL 50 MG: 10 INJECTION, EMULSION INTRAVENOUS at 08:23

## 2019-12-21 RX ADMIN — ONDANSETRON 4 MG: 2 INJECTION INTRAMUSCULAR; INTRAVENOUS at 11:57

## 2019-12-21 RX ADMIN — FAMOTIDINE 10 MG: 20 TABLET, FILM COATED ORAL at 17:20

## 2019-12-21 RX ADMIN — CEFAZOLIN SODIUM 2 G: 2 INJECTION, SOLUTION INTRAVENOUS at 17:18

## 2019-12-21 RX ADMIN — POTASSIUM CHLORIDE AND SODIUM CHLORIDE 125 ML: 900; 150 INJECTION, SOLUTION INTRAVENOUS at 17:18

## 2019-12-21 RX ADMIN — ACETAMINOPHEN 1000 MG: 500 TABLET ORAL at 07:20

## 2019-12-21 RX ADMIN — FENTANYL CITRATE 50 MCG: 0.05 INJECTION, SOLUTION INTRAMUSCULAR; INTRAVENOUS at 11:59

## 2019-12-21 RX ADMIN — OXYCODONE HYDROCHLORIDE 10 MG: 5 TABLET ORAL at 21:46

## 2019-12-21 RX ADMIN — DIPHENHYDRAMINE HYDROCHLORIDE 12.5 MG: 50 INJECTION INTRAMUSCULAR; INTRAVENOUS at 13:49

## 2019-12-21 RX ADMIN — OXYCODONE HYDROCHLORIDE 10 MG: 5 TABLET ORAL at 17:19

## 2019-12-21 RX ADMIN — METFORMIN HYDROCHLORIDE 500 MG: 500 TABLET ORAL at 17:21

## 2019-12-21 ASSESSMENT — LIFESTYLE VARIABLES
EVER FELT BAD OR GUILTY ABOUT YOUR DRINKING: NO
EVER_SMOKED: NEVER
EVER HAD A DRINK FIRST THING IN THE MORNING TO STEADY YOUR NERVES TO GET RID OF A HANGOVER: NO
HAVE PEOPLE ANNOYED YOU BY CRITICIZING YOUR DRINKING: NO
TOTAL SCORE: 0
TOTAL SCORE: 0
ALCOHOL_USE: NO
ON A TYPICAL DAY WHEN YOU DRINK ALCOHOL HOW MANY DRINKS DO YOU HAVE: 0
EVER_SMOKED: NEVER
HAVE YOU EVER FELT YOU SHOULD CUT DOWN ON YOUR DRINKING: NO
TOTAL SCORE: 0
HOW MANY TIMES IN THE PAST YEAR HAVE YOU HAD 5 OR MORE DRINKS IN A DAY: 0
CONSUMPTION TOTAL: NEGATIVE
AVERAGE NUMBER OF DAYS PER WEEK YOU HAVE A DRINK CONTAINING ALCOHOL: 0
EVER_SMOKED: NEVER

## 2019-12-21 ASSESSMENT — PATIENT HEALTH QUESTIONNAIRE - PHQ9
2. FEELING DOWN, DEPRESSED, IRRITABLE, OR HOPELESS: NOT AT ALL
SUM OF ALL RESPONSES TO PHQ9 QUESTIONS 1 AND 2: 0
1. LITTLE INTEREST OR PLEASURE IN DOING THINGS: NOT AT ALL

## 2019-12-21 ASSESSMENT — COGNITIVE AND FUNCTIONAL STATUS - GENERAL
DAILY ACTIVITIY SCORE: 24
SUGGESTED CMS G CODE MODIFIER MOBILITY: CH
MOBILITY SCORE: 24
SUGGESTED CMS G CODE MODIFIER DAILY ACTIVITY: CH

## 2019-12-21 ASSESSMENT — PAIN SCALES - GENERAL: PAIN_LEVEL: 1

## 2019-12-21 NOTE — ANESTHESIA POSTPROCEDURE EVALUATION
Patient: WILTON Chowdary    Procedure Summary     Date:  12/21/19 Room / Location:  [facility] 07 / SURGERY [facility]    Anesthesia Start:  0738 Anesthesia Stop:  1137    Procedures:       FUSION, SPINE, LUMBAR, PLIF-L3-S1 PEDICLE SCREWS (Bilateral Back)      LAMINECTOMY, SPINE, LUMBAR, WITH DISCECTOMY (Bilateral Back)      REMOVAL, HARDWARE-LUMBAR PEDICLE SCREW REMOVAL REDO (Bilateral Back) Diagnosis:       Lumbar radiculopathy      Lumbar spinal stenosis      (lumbar stenosis radiculopathy )    Surgeon:  Nestor Urbina M.D. Responsible Provider:  Yossi Maier D.O.    Anesthesia Type:  general ASA Status:  2          Final Anesthesia Type: general  Last vitals  BP   Blood Pressure : (!) 163/85    Temp   36.2 °C (97.2 °F)    Pulse   Pulse: 92   Resp   16    SpO2   97 %      Anesthesia Post Evaluation    Patient location during evaluation: PACU  Patient participation: complete - patient participated  Level of consciousness: awake and alert  Pain score: 1    Airway patency: patent  Anesthetic complications: no  Cardiovascular status: hemodynamically stable  Respiratory status: acceptable  Hydration status: euvolemic    PONV: none           Nurse Pain Score: 5 (NPRS)

## 2019-12-21 NOTE — OR SURGEON
Immediate Post OP Note    PreOp Diagnosis: L4-S1 DDD, stenosis, radiculoathy, low back pain, L5 fracture     PostOp Diagnosis: same    Procedure(s):  Hardware removal, redo L4-S1 laminectomy with left L3-S1 and right L2-S1 pedicle screw fusion   Wound Class: Clean with Drain    Surgeon(s):  Nestor Urbina M.D.    Anesthesiologist/Type of Anesthesia:  Anesthesiologist: Yossi Maier D.O./General    Surgical Staff:  Assistant: Dyana Mireles P.A.-C.  Circulator: Naomi Quinones R.N.  Relief Circulator: Tasneem Rodriguez R.N.  Relief Scrub: Monica Fraser  Scrub Person: Nathaniel Kruger  Radiology Technologist: Ambrosio Christie    Specimens removed if any:  * No specimens in log *    Estimated Blood Loss: 150cc    Findings: went well, see dictation     Complications: none        12/21/2019 11:45 AM Dyana Mireles P.A.-C.

## 2019-12-21 NOTE — OP REPORT
DATE OF SERVICE:  12/21/2019    PREOPERATIVE DIAGNOSES:  1.  Bilateral neurogenic claudication.  2.  Right L4-L5 radiculopathy.  3.  Coronal split fracture of L5, status post multilevel XLIF at L2-L5.  4.  Mechanical low back pain refractory to medical care.  5.  L4-L5 spondylolisthesis.    POSTOPERATIVE DIAGNOSES:  1.  Bilateral neurogenic claudication.  2.  Right L4-L5 radiculopathy.  3.  Coronal split fracture of L5, status post multilevel XLIF at L2-L5.  4.  Mechanical low back pain refractory to medical care.  5.  L4-L5 spondylolisthesis.    PROCEDURES:  1.  Redo bilateral L4 laminotomies, foraminotomies, decompression of bilateral   L4 nerve roots.  2.  Redo bilateral L5 laminotomies, foraminotomies, decompression of bilateral   L5 nerve roots.  3.  Redo bilateral S1 laminotomies, foraminotomies, decompression of bilateral   S1 nerve roots.  4.  Right L4 transpedicular approach far lateral decompression of right L4   nerve root.  5.  Right L5 transpedicular approach far lateral decompression of right L5   nerve root.  6.  Right S1 transpedicular approach far lateral decompression of right S1   nerve root.  7.  L2-S1 posterolateral arthrodesis with local morselized autograft, DBM and   NanoBone arthrodesis.  8.  Removal of prior RTI hardware.  9.  Exploration of prior fusion.  10.  Upsized L2-S1 RTI screw system bilaterally.  11.  Microscope for microdissection of spinal canal.  12.  SSEP, EMG monitoring, intraoperative screw stimulation performed by   neuromonitoring associates, which remained stable throughout.  13.  Modifier-22 for extra degree of difficulty given the patient's body mass   index of 36 with comorbidities of diabetes consistent with morbid obesity,   which added an extra hour-and-a-half to the standard procedure and hence   should be reflected for both the surgical codes and the hospital DRG codes for   major morbidity.    SURGEON:  Nestor Urbina MD, neurosurgery-spine surgery.    ASSISTANT:   Dyana Mireles PA-C    ANESTHESIA:  General endotracheal anesthesia.    ANESTHESIOLOGIST:  Yossi Maier DO    COMPLICATIONS:  None.    ESTIMATED BLOOD LOSS:  Less than 200 mL    PREOPERATIVE NOTE:  This extremely pleasant 74-year-old female who presents   with mechanical low back pain and lower extremity radicular leg pain, weakness   and paresthesia consistent with right L4-L5 radiculopathy.  The patient had   undergone multilevel XLIF at L3-L4 and L4-L5 in 07/2019 with posterior redo   laminectomy and left-sided L3-L5 pedicle screw fixation.  She did extremely   well after that surgery; however, most recently in the last month, she has had   increasing back pain and subsequent workup revealed a coronal split fracture   of L5 vertebral body and subsequent workup revealed significant collapse and   stenosis in combination with the spondylolisthesis at L4 made the spinal canal   very narrow.  Given the patient's neurological deficits on her CT scan and   given the stenosis, I offered the patient redo exploration, removal of prior   hardware and upsize screws with posterolateral arthrodesis to include the   sacral fixation.  I discussed surgical procedure, alternatives, goals, risks,   benefits, complications in detail with the patient, used a bone model, MRI and   educational handouts to assist the patient with her decision making, answered   all questions to the best of my ability.  The patient understood and   consented to surgery.    NARRATIVE DICTATION:  The patient was brought to the operating room and placed   under general endotracheal anesthesia.  She was placed prone on the operating   OSI table with care taken about the bony prominences, peripheral nerves.    Lumbar region was prepped and draped in usual sterile fashion.  Following   localization with cross table fluoroscopy, a midline incision was made from   L2-L5-S1.  The dorsal elements of L2-L5-S1 were exposed in subperiosteal   fashion at  the facets and transverse processes bilaterally.  I explored the   prior hardware.    I exposed out the hardware.  I removed the prior hardware.  For the transverse   processes, I explored the prior fusion, which did not have a good   posterolateral bone.  The left L4 screw was certainly loose and seemed to be   lateral in the pedicle.  I removed the prior hardware apart from the right   L2-L3 screws and left those in position as they were nicely down the pedicles   with good fixation.  The wound was irrigated with bacitracin irrigation.    At this stage, an extensive redo bilateral L4 laminotomy and foraminotomy was   completed, decompressing bilateral L4 nerve roots; separately and distinctly   extensive redo bilateral L5 laminotomies and foraminotomies completed,   decompressing bilateral L5 nerve root and separately redo bilateral S1   laminotomies and foraminotomies were completed decompressive bilateral S1   nerve roots.  There was still stenosis on the right side, hence I drilled down   the right L4 pedicle for transpedicular decompression of the left L4 nerve   root.  I also drilled down the right L5 pedicle for transpedicular   decompression of right L5 nerve root and I drilled down the right S1 pedicle   for transpedicular decompression of right S1 nerve root.  This required extra   degree of expertise, effort and time, hence a modifier-59 is required for CPT   code 63056-63057 x2.  The edges of bone were bone waxed.  Thrombin Gelfoam   placed in epidural gutters for hemostasis.  Roth ball hook was easily placed   over the exiting nerve roots.  Thecal sac and nerve roots were nice and freed   up throughout bilaterally out to the foramen.  The wound was irrigated with   bacitracin irrigation.  Immaculate hemostasis achieved.    I then placed upsize screws in the right side at L3, L4, L5 and S1 pedicles   using fluoroscopy and PediGuard to cannulate the screws.  I placed 50 mm x 7.5   diameter screws in  the sacrum out to the sacral prominence.  I  had excellent   purchase on the left side.  I also performed a right L4, L5 and S1 pedicle   placement in a similar fashion.  I stimulated all screws.  They all stimulated   well over 20 millivolts.  I have kept the L2 and L3 screws on the right side   as they had good position, and given the small pedicles, I did not want to   adjust those or remove them.  Once I stimulated all screws over 20 millivolts,   2 rods were cut to appropriate length and contoured in lordosis, placed over   the polyaxial screw heads, locking screws applied and secured and final   tightened.  I decorticated the transverse process of L2-S1 and packed local   morselized autograft and DBM and NanoBone for posterolateral arthrodesis from   L2-S1 levels.  I reinspected nerve roots, they were nice and freed.  Wound was   irrigated, hemostasis achieved with thrombin Gelfoam.  I placed vancomycin   antibiotic powder in the wound and then closed the wound over a drain brought   through a separate incision with 0 Vicryl deep fascia, 2-0 Vicryl deep dermal   layer, Steri-Strips to skin.  Small sterile dressing was applied.  Swabs,   needles, instruments correct by 2 count.  No complications were encountered.    The patient tolerated procedure, was stable and transferred to recovery room.    The patient will be observed at [facility] until she   meets discharge criteria.  The patient will follow up at [facility] in 2   weeks and 6 weeks' time as arranged.    INTRAOPERATIVE FINDINGS:  Severe stenosis bilaterally at L4-L5 and L5-S1,   which was freed up extensively.  No complications were encountered.  The   patient was neurologically intact in recovery room, which is certainly very   reassuring and I obtained an excellent stabilization from L2-S1 bilaterally   and packed plenty bone for posterolateral arthrodesis on the decorticated the   transverse processes.        ____________________________________     MD CHRIS KNIGHT / JEFFRY    DD:  12/21/2019 12:13:11  DT:  12/21/2019 12:50:05    D#:  [id number]  Job#:  [id number]    cc: Madison Lincoln MD

## 2019-12-21 NOTE — OR NURSING
"PT from OR w/ OPA in place, OPA d/c by PACU RN at 1145, placed on Mask at *l, weaned to NC 2L, sats > 97%.  PT w/ hemovac, dressing is steri strips, 4x4 and medipore tape, CDI.  PT A/O x4, able to move all extremities w/o difficulty, +5/5 equal b/l strength upper and lower.  PT tolerating water and cranberry juice.  Reported pain 5/10, medicated w/ Fentanyl 100 mcg, Zofran 4 mg and Oxycodone 10mg PO, PT reports tolerable pain at this time, 1-2/10.  Belongings at bedside including LSO brace, dentures and glasses in place.  Attempted to give DTR update, advised  she would be in her car \"sleeping\" and to call w/ updates, attempted to call however, not able to get a hold of DTR, phone not on, message not left.  Will attempt later once bed assignment has been made.   "

## 2019-12-21 NOTE — ANESTHESIA QCDR
2019 [facility] Clinical Data Registry (for Quality Improvement)     Postoperative nausea/vomiting risk protocol (Adult = 18 yrs and Pediatric 3-17 yrs)- (430 and 463)  General inhalation anesthetic (NOT TIVA) with PONV risk factors: Yes  Provision of anti-emetic therapy with at least 2 different classes of agents: Yes   Patient DID NOT receive anti-emetic therapy and reason is documented in Medical Record:  N/A    Multimodal Pain Management- (AQI59)  Patient undergoing Elective Surgery (i.e. Outpatient, or ASC, or Prescheduled Surgery prior to Hospital Admission): Yes  Use of Multimodal Pain Management, two or more drugs and/or interventions, NOT including systemic opioids: Yes   Exception: Documented allergy to multiple classes of analgesics:  N/A    PACU assessment of acute postoperative pain prior to Anesthesia Care End- Applies to Patients Age = 18- (ABG7)  Initial PACU pain score is which of the following: < 7/10  Patient unable to report pain score: N/A    Post-anesthetic transfer of care checklist/protocol to PACU/ICU- (426 and 427)  Upon conclusion of case, patient transferred to which of the following locations: PACU/Non-ICU  Use of transfer checklist/protocol: Yes  Exclusion: Service Performed in Patient Hospital Room (and thus did not require transfer): N/A    PACU Reintubation- (AQI31)  General anesthesia requiring endotracheal intubation (ETT) along with subsequent extubation in OR or PACU: Yes  Required reintubation in the PACU: No   Extubation was a planned trial documented in the medical record prior to removal of the original airway device:  N/A    Unplanned admission to ICU related to anesthesia service up through end of PACU care- (MD51)  Unplanned admission to ICU (not initially anticipated at anesthesia start time): No

## 2019-12-21 NOTE — ANESTHESIA TIME REPORT
Anesthesia Start and Stop Event Times     Date Time Event    12/21/2019 0655 Ready for Procedure     0738 Anesthesia Start     1137 Anesthesia Stop        Responsible Staff  12/21/19    Name Role Begin End    Yossi Maier D.O. Anesth 0738 1137        Preop Diagnosis (Free Text):  Pre-op Diagnosis     LUMBAR STENOSIS, RADICULOPATHY        Preop Diagnosis (Codes):  Diagnosis Information     Diagnosis Code(s): Lumbar radiculopathy [M54.16]     Lumbar spinal stenosis [M48.061]        Post op Diagnosis  Lumbar spinal stenosis  lower back pain    Premium Reason  E. Weekend    Comments:

## 2019-12-21 NOTE — ANESTHESIA PROCEDURE NOTES
Airway  Date/Time: 12/21/2019 7:44 AM  Performed by: Yossi Maier D.O.  Authorized by: Yossi Maier D.O.     Location:  OR  Urgency:  Elective  Indications for Airway Management:  Anesthesia  Spontaneous Ventilation: absent    Sedation Level:  Deep  Preoxygenated: Yes    Patient Position:  Sniffing  Final Airway Type:  Endotracheal airway  Final Endotracheal Airway:  ETT  Technique Used for Successful ETT Placement:  Direct laryngoscopy  Blade Type:  Dominik  Laryngoscope Blade/Videolaryngoscope Blade Size:  3  ETT Size (mm):  8.0  Cormack-Lehane Classification:  Grade I - full view of glottis  Number of Attempts at Approach:  1

## 2019-12-21 NOTE — OR NURSING
PT DTR Aleena updated on status after calling PACU.  PT given hardware that was removed.  Awaiting bed assignement.

## 2019-12-21 NOTE — ANESTHESIA PREPROCEDURE EVALUATION
Relevant Problems   ANESTHESIA   (+) Sleep apnea      PULMONARY (within normal limits)      NEURO (within normal limits)      CARDIAC   (+) Essential hypertension      GI   (+) Hiatal hernia       (within normal limits)      ENDO   (+) Diabetes mellitus, type 2 (HCC)   (+) Thyroid activity decreased       Physical Exam    Airway   Mallampati: II  TM distance: >3 FB  Neck ROM: full       Cardiovascular - normal exam  Rhythm: regular  Rate: normal  (-) murmur     Dental - normal exam         Pulmonary - normal exam  Breath sounds clear to auscultation     Abdominal    Neurological - normal exam                 Anesthesia Plan    ASA 2       Plan - general       Airway plan will be ETT        Induction: intravenous    Postoperative Plan: Postoperative administration of opioids is intended.    Pertinent diagnostic labs and testing reviewed    Informed Consent:    Anesthetic plan and risks discussed with patient.    Use of blood products discussed with: patient whom consented to blood products.

## 2019-12-22 LAB
ANION GAP SERPL CALC-SCNC: 10 MMOL/L (ref 0–11.9)
BUN SERPL-MCNC: 23 MG/DL (ref 8–22)
CALCIUM SERPL-MCNC: 7.4 MG/DL (ref 8.5–10.5)
CHLORIDE SERPL-SCNC: 103 MMOL/L (ref 96–112)
CO2 SERPL-SCNC: 24 MMOL/L (ref 20–33)
CREAT SERPL-MCNC: 1.49 MG/DL (ref 0.5–1.4)
ERYTHROCYTE [DISTWIDTH] IN BLOOD BY AUTOMATED COUNT: 54.4 FL (ref 35.9–50)
GLUCOSE SERPL-MCNC: 222 MG/DL (ref 65–99)
HCT VFR BLD AUTO: 28.3 % (ref 37–47)
HGB BLD-MCNC: 8.9 G/DL (ref 12–16)
MCH RBC QN AUTO: 29.8 PG (ref 27–33)
MCHC RBC AUTO-ENTMCNC: 31.4 G/DL (ref 33.6–35)
MCV RBC AUTO: 94.6 FL (ref 81.4–97.8)
PLATELET # BLD AUTO: 185 K/UL (ref 164–446)
PMV BLD AUTO: 10.4 FL (ref 9–12.9)
POTASSIUM SERPL-SCNC: 4 MMOL/L (ref 3.6–5.5)
RBC # BLD AUTO: 2.99 M/UL (ref 4.2–5.4)
SODIUM SERPL-SCNC: 137 MMOL/L (ref 135–145)
WBC # BLD AUTO: 8.1 K/UL (ref 4.8–10.8)

## 2019-12-22 PROCEDURE — 97165 OT EVAL LOW COMPLEX 30 MIN: CPT

## 2019-12-22 PROCEDURE — 85027 COMPLETE CBC AUTOMATED: CPT

## 2019-12-22 PROCEDURE — 80048 BASIC METABOLIC PNL TOTAL CA: CPT

## 2019-12-22 PROCEDURE — A9270 NON-COVERED ITEM OR SERVICE: HCPCS | Performed by: PHYSICIAN ASSISTANT

## 2019-12-22 PROCEDURE — 700111 HCHG RX REV CODE 636 W/ 250 OVERRIDE (IP): Performed by: PHYSICIAN ASSISTANT

## 2019-12-22 PROCEDURE — 770001 HCHG ROOM/CARE - MED/SURG/GYN PRIV*

## 2019-12-22 PROCEDURE — 97161 PT EVAL LOW COMPLEX 20 MIN: CPT

## 2019-12-22 PROCEDURE — 36415 COLL VENOUS BLD VENIPUNCTURE: CPT

## 2019-12-22 PROCEDURE — 700102 HCHG RX REV CODE 250 W/ 637 OVERRIDE(OP): Performed by: PHYSICIAN ASSISTANT

## 2019-12-22 PROCEDURE — 700112 HCHG RX REV CODE 229: Performed by: PHYSICIAN ASSISTANT

## 2019-12-22 RX ADMIN — METHOCARBAMOL TABLETS 750 MG: 750 TABLET, COATED ORAL at 20:34

## 2019-12-22 RX ADMIN — ATORVASTATIN CALCIUM 40 MG: 40 TABLET, FILM COATED ORAL at 20:34

## 2019-12-22 RX ADMIN — SENNOSIDES AND DOCUSATE SODIUM 1 TABLET: 8.6; 5 TABLET ORAL at 20:34

## 2019-12-22 RX ADMIN — DOCUSATE SODIUM 100 MG: 100 CAPSULE, LIQUID FILLED ORAL at 16:28

## 2019-12-22 RX ADMIN — POLYETHYLENE GLYCOL 3350 1 PACKET: 17 POWDER, FOR SOLUTION ORAL at 05:02

## 2019-12-22 RX ADMIN — METFORMIN HYDROCHLORIDE 500 MG: 500 TABLET ORAL at 08:15

## 2019-12-22 RX ADMIN — FAMOTIDINE 10 MG: 20 TABLET, FILM COATED ORAL at 16:27

## 2019-12-22 RX ADMIN — GABAPENTIN 300 MG: 300 CAPSULE ORAL at 16:28

## 2019-12-22 RX ADMIN — CEFAZOLIN SODIUM 2 G: 2 INJECTION, SOLUTION INTRAVENOUS at 08:16

## 2019-12-22 RX ADMIN — LEVOTHYROXINE SODIUM 50 MCG: 50 TABLET ORAL at 05:03

## 2019-12-22 RX ADMIN — DOCUSATE SODIUM 100 MG: 100 CAPSULE, LIQUID FILLED ORAL at 05:03

## 2019-12-22 RX ADMIN — OMEGA-3 FATTY ACIDS CAP 1000 MG 1000 MG: 1000 CAP at 05:03

## 2019-12-22 RX ADMIN — GABAPENTIN 300 MG: 300 CAPSULE ORAL at 05:03

## 2019-12-22 RX ADMIN — OXYCODONE HYDROCHLORIDE 10 MG: 5 TABLET ORAL at 13:17

## 2019-12-22 RX ADMIN — OXYCODONE HYDROCHLORIDE 10 MG: 5 TABLET ORAL at 19:26

## 2019-12-22 RX ADMIN — ANTACID TABLETS 500 MG: 500 TABLET, CHEWABLE ORAL at 16:28

## 2019-12-22 RX ADMIN — DULOXETINE HYDROCHLORIDE 60 MG: 60 CAPSULE, DELAYED RELEASE ORAL at 05:03

## 2019-12-22 RX ADMIN — ENOXAPARIN SODIUM 40 MG: 100 INJECTION SUBCUTANEOUS at 13:17

## 2019-12-22 RX ADMIN — TRAZODONE HYDROCHLORIDE 50 MG: 50 TABLET ORAL at 22:04

## 2019-12-22 RX ADMIN — OXYCODONE HYDROCHLORIDE 10 MG: 5 TABLET ORAL at 08:14

## 2019-12-22 RX ADMIN — DIPHENHYDRAMINE HYDROCHLORIDE 25 MG: 25 TABLET ORAL at 19:27

## 2019-12-22 RX ADMIN — CEFAZOLIN SODIUM 2 G: 2 INJECTION, SOLUTION INTRAVENOUS at 00:02

## 2019-12-22 RX ADMIN — METFORMIN HYDROCHLORIDE 500 MG: 500 TABLET ORAL at 16:28

## 2019-12-22 RX ADMIN — DULOXETINE HYDROCHLORIDE 60 MG: 60 CAPSULE, DELAYED RELEASE ORAL at 16:28

## 2019-12-22 RX ADMIN — ACETAMINOPHEN 650 MG: 325 TABLET, FILM COATED ORAL at 13:17

## 2019-12-22 RX ADMIN — CEFAZOLIN SODIUM 2 G: 2 INJECTION, SOLUTION INTRAVENOUS at 16:28

## 2019-12-22 RX ADMIN — MAGNESIUM HYDROXIDE 30 ML: 400 SUSPENSION ORAL at 20:35

## 2019-12-22 RX ADMIN — GABAPENTIN 300 MG: 300 CAPSULE ORAL at 13:17

## 2019-12-22 RX ADMIN — ANTACID TABLETS 500 MG: 500 TABLET, CHEWABLE ORAL at 05:03

## 2019-12-22 RX ADMIN — FAMOTIDINE 10 MG: 20 TABLET, FILM COATED ORAL at 05:03

## 2019-12-22 ASSESSMENT — COGNITIVE AND FUNCTIONAL STATUS - GENERAL
DAILY ACTIVITIY SCORE: 24
SUGGESTED CMS G CODE MODIFIER MOBILITY: CI
MOBILITY SCORE: 23
SUGGESTED CMS G CODE MODIFIER DAILY ACTIVITY: CH
CLIMB 3 TO 5 STEPS WITH RAILING: A LITTLE

## 2019-12-22 ASSESSMENT — ACTIVITIES OF DAILY LIVING (ADL): TOILETING: INDEPENDENT

## 2019-12-22 ASSESSMENT — GAIT ASSESSMENTS
GAIT LEVEL OF ASSIST: SUPERVISED
DISTANCE (FEET): 200
ASSISTIVE DEVICE: FRONT WHEEL WALKER

## 2019-12-22 NOTE — CARE PLAN
Problem: Safety  Goal: Will remain free from injury  Outcome: PROGRESSING AS EXPECTED  Intervention: Provide assistance with mobility  Flowsheets (Taken 12/21/2019 2313)  Assistance: Assistance of One  Note:   Encourage to call for any assistance needed, call light within reach.     Problem: Pain Management  Goal: Pain level will decrease to patient's comfort goal  Outcome: PROGRESSING AS EXPECTED  Intervention: Educate and implement non-pharmacologic comfort measures. Examples: relaxation, distration, play therapy, activity therapy, massage, etc.  Flowsheets (Taken 12/21/2019 2313)  Intervention: Relaxation Technique; Repositioned; Rest; Medication (see MAR)  Note:   Pain assessment q 2 hours, medicated as needed, comfort measures provided.

## 2019-12-22 NOTE — THERAPY
"Occupational Therapy Evaluation completed.   Functional Status:  SPV ADLs and ADL transfers, mod I brace don/doff   Plan of Care: Patient with no further skilled OT needs in the acute care setting at this time  Discharge Recommendations:  Equipment: No Equipment Needed. Post-acute therapy Recommend home health transitional care for continued occupational therapy services.     See \"Rehab Therapy-Acute\" Patient Summary Report for complete documentation.    Pt is 75yo female s/p redo L4-S1 laminectomy/fusion. Reviewed post-op precautions, brace wearing instructions and provided education on compensatory strategies for ADLs. Post-op packet provided for reference of education, pt reports familiarity from previous surgeries. Supportive daughter present, lives down the street. Pt has all DME installed in her home including shower chair, elevated toilet seat, grab bars, SPC and 4WW, pt also has AE for ADLs including reacher, sock aid, and long handled sponges. Pt with no additonal acute OT needs however may benefit from HHOT to address higher level IADLs and community mobility.   "

## 2019-12-22 NOTE — THERAPY
"Physical Therapy Evaluation completed.   Bed Mobility: Supervised without bed features    Transfers: Supervised   Gait: Supervised  with Front-Wheel Walker       Plan of Care: Patient with no further skilled PT needs in the acute care setting at this time  Discharge Recommendations: Equipment: No Equipment Needed. Post-acute therapy Discharge to home with outpatient for additional skilled therapy services.    Pt is s/p hardware removal, redo L4-S1 laminectomy with left L3-S1, and right L2-S1 pedicle screw fusion presenting without gross mobility deficit. She reported mild pain during eval but was able to complete x200' of gait with FWW and no LOB. Pt is familiar with precautions from previous surgeries and was also able to manage her brace without assist. At this time, pt does not require further acute PT intervention and appears functionally capable of return home with OP PT follow up as appropriate.     See \"Rehab Therapy-Acute\" Patient Summary Report for complete documentation.     "

## 2019-12-22 NOTE — PROGRESS NOTES
Neurosurgery Progress Note    Subjective:  POD#1 L4-S1 Redo Laminectomy with Left L3-S1 and Right L2-S1 Pedicle screw fusion with removal of failed hardware  Doing well with expected back pain.  Right leg better, left leg sore, but strong.  Ambulating in room and in garza well in LSO brace.  Using walker.  Drain high at 280cc/8 hours.  Hopefully home tomorrow.    Exam:  Wound C/D/I   Strength 5/5   Sensory intact  Ambulating well.    BP  Min: 97/44  Max: 154/73  Pulse  Av.4  Min: 90  Max: 127  Resp  Avg: 15.9  Min: 11  Max: 19  Temp  Av.3 °C (97.4 °F)  Min: 35.8 °C (96.5 °F)  Max: 37.7 °C (99.8 °F)  SpO2  Av.8 %  Min: 94 %  Max: 99 %    No data recorded    Recent Labs     19  0426   WBC 8.1   RBC 2.99*   HEMOGLOBIN 8.9*   HEMATOCRIT 28.3*   MCV 94.6   MCH 29.8   MCHC 31.4*   RDW 54.4*   PLATELETCT 185   MPV 10.4     Recent Labs     19  0426   SODIUM 137   POTASSIUM 4.0   CHLORIDE 103   CO2 24   GLUCOSE 222*   BUN 23*   CREATININE 1.49*   CALCIUM 7.4*               Intake/Output       19 0700 - 19 0659 19 0700 - 19 0659      [id number] 3868-1522 Total 8494-1206 4912-9818 Total       Intake    P.O.  --  740 740  240  -- 240    P.O. -- 740 740 240 -- 240    I.V.  1581.4  1472.9 3054.4  --  -- --    Ketamine Volume 100.9 -- 100.9 -- -- --    Propofol Volume 80.5 -- 80.5 -- -- --    Volume (mL) (0.9 % NaCl with KCl 20 mEq infusion) -- 1472.9 1472.9 -- -- --    Volume (mL) (lactated ringers infusion) 1400 -- 1400 -- -- --    Total Intake 1581.4 2212.9 3794.4 240 -- 240       Output    Urine  --  -- --  --  -- --    Number of Times Voided 1 x 2 x 3 x 1 x -- 1 x    Drains  120  430 550  --  -- --    Output (mL) (Closed/Suction Drain 1 Posterior Back Hemovac) 120 430 550 -- -- --    Blood  125  -- 125  --  -- --    Est. Blood Loss 125 -- 125 -- -- --    Total Output 245 430 675 -- -- --       Net I/O     1336.4 1782.9 3119.4 240 -- 240            Intake/Output Summary (Last  24 hours) at 12/22/2019 1129  Last data filed at 12/22/2019 0927  Gross per 24 hour   Intake 3034.36 ml   Output 675 ml   Net 2359.36 ml            • atorvastatin  40 mg Nightly   • DULoxetine  60 mg BID   • gabapentin  300 mg TID   • levothyroxine  50 mcg AM ES   • lisinopril  20 mg Q EVENING   • metFORMIN  500 mg BID WITH MEALS   • fish oil  1,000 mg DAILY   • traZODone  50 mg Nightly   • Pharmacy Consult Request  1 Each PHARMACY TO DOSE   • docusate sodium  100 mg BID   • senna-docusate  1 Tab Nightly   • senna-docusate  1 Tab Q24HRS PRN   • polyethylene glycol/lytes  1 Packet DAILY   • magnesium hydroxide  30 mL QDAY PRN   • bisacodyl  10 mg Q24HRS PRN   • fleet  1 Each Once PRN   • 0.9 % NaCl with KCl 20 mEq 1,000 mL   Continuous   • ceFAZolin  2 g Q8HR   • diphenhydrAMINE  25 mg Q6HRS PRN    Or   • diphenhydrAMINE  25 mg Q6HRS PRN   • ondansetron  4 mg Q4HRS PRN   • ondansetron  4 mg Q4HRS PRN   • methocarbamol  750 mg 4X/DAY PRN   • ALPRAZolam  0.25 mg TID PRN   • labetalol  10 mg Q HOUR PRN   • benzocaine-menthol  1 Lozenge Q2HRS PRN   • calcium carbonate  500 mg BID   • enoxaparin  40 mg DAILY   • morphine injection  4 mg Q2HRS PRN   • oxyCODONE immediate-release  5 mg Q4HRS PRN   • HYDROcodone/acetaminophen  1 Tab Q4HRS PRN   • oxyCODONE immediate-release  10 mg Q4HRS PRN   • acetaminophen  650 mg Q6HRS PRN   • famotidine  10 mg BID   • [START ON 12/23/2019] doxycycline monohydrate  100 mg BID       Assessment and Plan:  POD#1 L4-S1 Redo Laminectomy with Left L3-S1 and Right L2-S1 Pedicle screw fusion with removal of failed hardware.  Continue current pain meds and drain monitoring   May ambulate to BR without brace, but needs it for walks outide room.  Hope to go home tomorrow or Tuesday  Prophylactic anticoagulation: yes         Start date/time: today

## 2019-12-22 NOTE — CARE PLAN
Problem: Communication  Goal: The ability to communicate needs accurately and effectively will improve  Outcome: PROGRESSING AS EXPECTED     Problem: Safety  Goal: Will remain free from injury  Outcome: PROGRESSING AS EXPECTED  Goal: Will remain free from falls  Outcome: PROGRESSING AS EXPECTED     Problem: Knowledge Deficit  Goal: Knowledge of disease process/condition, treatment plan, diagnostic tests, and medications will improve  Outcome: PROGRESSING AS EXPECTED     Problem: Pain Management  Goal: Pain level will decrease to patient's comfort goal  Outcome: PROGRESSING AS EXPECTED

## 2019-12-23 LAB
ANION GAP SERPL CALC-SCNC: 7 MMOL/L (ref 0–11.9)
BUN SERPL-MCNC: 24 MG/DL (ref 8–22)
CALCIUM SERPL-MCNC: 8.1 MG/DL (ref 8.5–10.5)
CHLORIDE SERPL-SCNC: 100 MMOL/L (ref 96–112)
CO2 SERPL-SCNC: 29 MMOL/L (ref 20–33)
CREAT SERPL-MCNC: 1.4 MG/DL (ref 0.5–1.4)
ERYTHROCYTE [DISTWIDTH] IN BLOOD BY AUTOMATED COUNT: 54 FL (ref 35.9–50)
GLUCOSE BLD-MCNC: 123 MG/DL (ref 65–99)
GLUCOSE BLD-MCNC: 134 MG/DL (ref 65–99)
GLUCOSE BLD-MCNC: 152 MG/DL (ref 65–99)
GLUCOSE SERPL-MCNC: 164 MG/DL (ref 65–99)
HCT VFR BLD AUTO: 27 % (ref 37–47)
HGB BLD-MCNC: 8.6 G/DL (ref 12–16)
MCH RBC QN AUTO: 30 PG (ref 27–33)
MCHC RBC AUTO-ENTMCNC: 31.9 G/DL (ref 33.6–35)
MCV RBC AUTO: 94.1 FL (ref 81.4–97.8)
PLATELET # BLD AUTO: 166 K/UL (ref 164–446)
PMV BLD AUTO: 10.5 FL (ref 9–12.9)
POTASSIUM SERPL-SCNC: 4.2 MMOL/L (ref 3.6–5.5)
RBC # BLD AUTO: 2.87 M/UL (ref 4.2–5.4)
SODIUM SERPL-SCNC: 136 MMOL/L (ref 135–145)
WBC # BLD AUTO: 8.5 K/UL (ref 4.8–10.8)

## 2019-12-23 PROCEDURE — 85027 COMPLETE CBC AUTOMATED: CPT

## 2019-12-23 PROCEDURE — A9270 NON-COVERED ITEM OR SERVICE: HCPCS | Performed by: PHYSICIAN ASSISTANT

## 2019-12-23 PROCEDURE — 700111 HCHG RX REV CODE 636 W/ 250 OVERRIDE (IP): Performed by: PHYSICIAN ASSISTANT

## 2019-12-23 PROCEDURE — 700102 HCHG RX REV CODE 250 W/ 637 OVERRIDE(OP): Performed by: PHYSICIAN ASSISTANT

## 2019-12-23 PROCEDURE — 80048 BASIC METABOLIC PNL TOTAL CA: CPT

## 2019-12-23 PROCEDURE — 770001 HCHG ROOM/CARE - MED/SURG/GYN PRIV*

## 2019-12-23 PROCEDURE — 36415 COLL VENOUS BLD VENIPUNCTURE: CPT

## 2019-12-23 PROCEDURE — 82962 GLUCOSE BLOOD TEST: CPT

## 2019-12-23 PROCEDURE — 700112 HCHG RX REV CODE 229: Performed by: PHYSICIAN ASSISTANT

## 2019-12-23 RX ADMIN — ANTACID TABLETS 500 MG: 500 TABLET, CHEWABLE ORAL at 17:47

## 2019-12-23 RX ADMIN — DOCUSATE SODIUM 100 MG: 100 CAPSULE, LIQUID FILLED ORAL at 17:47

## 2019-12-23 RX ADMIN — OXYCODONE HYDROCHLORIDE 5 MG: 5 TABLET ORAL at 00:11

## 2019-12-23 RX ADMIN — GABAPENTIN 300 MG: 300 CAPSULE ORAL at 13:05

## 2019-12-23 RX ADMIN — GABAPENTIN 300 MG: 300 CAPSULE ORAL at 04:35

## 2019-12-23 RX ADMIN — DOXYCYCLINE 100 MG: 100 TABLET, FILM COATED ORAL at 17:48

## 2019-12-23 RX ADMIN — FAMOTIDINE 10 MG: 20 TABLET, FILM COATED ORAL at 04:34

## 2019-12-23 RX ADMIN — CEFAZOLIN SODIUM 2 G: 2 INJECTION, SOLUTION INTRAVENOUS at 00:12

## 2019-12-23 RX ADMIN — OMEGA-3 FATTY ACIDS CAP 1000 MG 1000 MG: 1000 CAP at 04:35

## 2019-12-23 RX ADMIN — DOXYCYCLINE 100 MG: 100 TABLET, FILM COATED ORAL at 04:36

## 2019-12-23 RX ADMIN — METFORMIN HYDROCHLORIDE 500 MG: 500 TABLET ORAL at 06:42

## 2019-12-23 RX ADMIN — ATORVASTATIN CALCIUM 40 MG: 40 TABLET, FILM COATED ORAL at 20:09

## 2019-12-23 RX ADMIN — POLYETHYLENE GLYCOL 3350 1 PACKET: 17 POWDER, FOR SOLUTION ORAL at 04:35

## 2019-12-23 RX ADMIN — TRAZODONE HYDROCHLORIDE 50 MG: 50 TABLET ORAL at 20:08

## 2019-12-23 RX ADMIN — FAMOTIDINE 10 MG: 20 TABLET, FILM COATED ORAL at 17:46

## 2019-12-23 RX ADMIN — ANTACID TABLETS 500 MG: 500 TABLET, CHEWABLE ORAL at 04:35

## 2019-12-23 RX ADMIN — LEVOTHYROXINE SODIUM 50 MCG: 50 TABLET ORAL at 04:35

## 2019-12-23 RX ADMIN — GABAPENTIN 300 MG: 300 CAPSULE ORAL at 17:47

## 2019-12-23 RX ADMIN — INSULIN HUMAN 2 UNITS: 100 INJECTION, SOLUTION PARENTERAL at 13:12

## 2019-12-23 RX ADMIN — LISINOPRIL 20 MG: 20 TABLET ORAL at 17:46

## 2019-12-23 RX ADMIN — OXYCODONE HYDROCHLORIDE 10 MG: 5 TABLET ORAL at 17:58

## 2019-12-23 RX ADMIN — DULOXETINE HYDROCHLORIDE 60 MG: 60 CAPSULE, DELAYED RELEASE ORAL at 17:47

## 2019-12-23 RX ADMIN — DOCUSATE SODIUM 100 MG: 100 CAPSULE, LIQUID FILLED ORAL at 04:35

## 2019-12-23 RX ADMIN — SENNOSIDES AND DOCUSATE SODIUM 1 TABLET: 8.6; 5 TABLET ORAL at 20:09

## 2019-12-23 RX ADMIN — OXYCODONE HYDROCHLORIDE 10 MG: 5 TABLET ORAL at 04:34

## 2019-12-23 RX ADMIN — DULOXETINE HYDROCHLORIDE 60 MG: 60 CAPSULE, DELAYED RELEASE ORAL at 04:34

## 2019-12-23 RX ADMIN — MAGNESIUM HYDROXIDE 30 ML: 400 SUSPENSION ORAL at 18:01

## 2019-12-23 RX ADMIN — HYDROCODONE BITARTRATE AND ACETAMINOPHEN 1 TABLET: 10; 325 TABLET ORAL at 20:09

## 2019-12-23 NOTE — PROGRESS NOTES
Neurosurgery Progress Note    Subjective:  POD#2 L4-S1 Redo Laminectomy with Left L3-S1 and Right L2-S1 Pedicle screw fusion with removal of failed hardware  Doing well with expected back pain.  Denies LE pain  Ambulating well  Drain high at 120cc/8 hours.  Hopefully home tomorrow; lives alone in [place]  Cr 1.4    Exam:  Wound C/D/I   Strength 5/5   Sensory intact  Ambulating well.    BP  Min: 95/47  Max: 121/53  Pulse  Av  Min: 92  Max: 100  Resp  Av.1  Min: 16  Max: 18  Temp  Av.4 °C (97.5 °F)  Min: 36 °C (96.8 °F)  Max: 36.6 °C (97.8 °F)  SpO2  Av.2 %  Min: 92 %  Max: 98 %    No data recorded    Recent Labs     19  04219   WBC 8.1 8.5   RBC 2.99* 2.87*   HEMOGLOBIN 8.9* 8.6*   HEMATOCRIT 28.3* 27.0*   MCV 94.6 94.1   MCH 29.8 30.0   MCHC 31.4* 31.9*   RDW 54.4* 54.0*   PLATELETCT 185 166   MPV 10.4 10.5     Recent Labs     19  0426 19   SODIUM 137 136   POTASSIUM 4.0 4.2   CHLORIDE 103 100   CO2 24 29   GLUCOSE 222* 164*   BUN 23* 24*   CREATININE 1.49* 1.40   CALCIUM 7.4* 8.1*               Intake/Output       19 - 19 0659 19 - 19 0659       Total  Total       Intake    P.O.  240  360 600  --  -- --    P.O. 240 360 600 -- -- --    Total Intake 240 360 600 -- -- --       Output    Urine  --  -- --  --  -- --    Number of Times Voided 1 x 1 x 2 x -- -- --    Drains  150  300 450  --  -- --    Output (mL) (Closed/Suction Drain 1 Posterior Back Hemovac) 150 300 450 -- -- --    Total Output 150 300 450 -- -- --       Net I/O     90 60 150 -- -- --            Intake/Output Summary (Last 24 hours) at 2019 0826  Last data filed at 2019 0400  Gross per 24 hour   Intake 600 ml   Output 450 ml   Net 150 ml            • atorvastatin  40 mg Nightly   • DULoxetine  60 mg BID   • gabapentin  300 mg TID   • levothyroxine  50 mcg AM ES   • lisinopril  20 mg Q EVENING   • metFORMIN  500 mg  BID WITH MEALS   • fish oil  1,000 mg DAILY   • traZODone  50 mg Nightly   • Pharmacy Consult Request  1 Each PHARMACY TO DOSE   • docusate sodium  100 mg BID   • senna-docusate  1 Tab Nightly   • senna-docusate  1 Tab Q24HRS PRN   • polyethylene glycol/lytes  1 Packet DAILY   • magnesium hydroxide  30 mL QDAY PRN   • bisacodyl  10 mg Q24HRS PRN   • fleet  1 Each Once PRN   • 0.9 % NaCl with KCl 20 mEq 1,000 mL   Continuous   • ceFAZolin  2 g Q8HR   • diphenhydrAMINE  25 mg Q6HRS PRN    Or   • diphenhydrAMINE  25 mg Q6HRS PRN   • ondansetron  4 mg Q4HRS PRN   • ondansetron  4 mg Q4HRS PRN   • methocarbamol  750 mg 4X/DAY PRN   • ALPRAZolam  0.25 mg TID PRN   • labetalol  10 mg Q HOUR PRN   • benzocaine-menthol  1 Lozenge Q2HRS PRN   • calcium carbonate  500 mg BID   • enoxaparin  40 mg DAILY   • morphine injection  4 mg Q2HRS PRN   • oxyCODONE immediate-release  5 mg Q4HRS PRN   • HYDROcodone/acetaminophen  1 Tab Q4HRS PRN   • oxyCODONE immediate-release  10 mg Q4HRS PRN   • acetaminophen  650 mg Q6HRS PRN   • famotidine  10 mg BID   • doxycycline monohydrate  100 mg BID       Assessment and Plan:  POD#2 L4-S1 Redo Laminectomy with Left L3-S1 and Right L2-S1 Pedicle screw fusion with removal of failed hardware.  Continue current pain meds and drain monitoring; drain to half suction   May ambulate to BR without brace, but needs it for walks outide room.  Cr. 1.4; hold metformin, encourage fluid intake  Recheck labs in AM tomorrow  Hope to go home tomorrow  Patient declining home health  Prophylactic anticoagulation: yes         Start date/time: today

## 2019-12-24 LAB
ANION GAP SERPL CALC-SCNC: 4 MMOL/L (ref 0–11.9)
BASOPHILS # BLD AUTO: 0.4 % (ref 0–1.8)
BASOPHILS # BLD: 0.02 K/UL (ref 0–0.12)
BUN SERPL-MCNC: 17 MG/DL (ref 8–22)
CALCIUM SERPL-MCNC: 8.5 MG/DL (ref 8.5–10.5)
CHLORIDE SERPL-SCNC: 99 MMOL/L (ref 96–112)
CO2 SERPL-SCNC: 30 MMOL/L (ref 20–33)
CREAT SERPL-MCNC: 0.86 MG/DL (ref 0.5–1.4)
EOSINOPHIL # BLD AUTO: 0.17 K/UL (ref 0–0.51)
EOSINOPHIL NFR BLD: 3.3 % (ref 0–6.9)
ERYTHROCYTE [DISTWIDTH] IN BLOOD BY AUTOMATED COUNT: 53.1 FL (ref 35.9–50)
GLUCOSE BLD-MCNC: 122 MG/DL (ref 65–99)
GLUCOSE SERPL-MCNC: 156 MG/DL (ref 65–99)
HCT VFR BLD AUTO: 27 % (ref 37–47)
HGB BLD-MCNC: 8.5 G/DL (ref 12–16)
IMM GRANULOCYTES # BLD AUTO: 0.05 K/UL (ref 0–0.11)
IMM GRANULOCYTES NFR BLD AUTO: 1 % (ref 0–0.9)
LYMPHOCYTES # BLD AUTO: 1.59 K/UL (ref 1–4.8)
LYMPHOCYTES NFR BLD: 30.8 % (ref 22–41)
MCH RBC QN AUTO: 29.6 PG (ref 27–33)
MCHC RBC AUTO-ENTMCNC: 31.5 G/DL (ref 33.6–35)
MCV RBC AUTO: 94.1 FL (ref 81.4–97.8)
MONOCYTES # BLD AUTO: 0.38 K/UL (ref 0–0.85)
MONOCYTES NFR BLD AUTO: 7.4 % (ref 0–13.4)
NEUTROPHILS # BLD AUTO: 2.95 K/UL (ref 2–7.15)
NEUTROPHILS NFR BLD: 57.1 % (ref 44–72)
NRBC # BLD AUTO: 0 K/UL
NRBC BLD-RTO: 0 /100 WBC
PLATELET # BLD AUTO: 176 K/UL (ref 164–446)
PMV BLD AUTO: 10.6 FL (ref 9–12.9)
POTASSIUM SERPL-SCNC: 4.3 MMOL/L (ref 3.6–5.5)
RBC # BLD AUTO: 2.87 M/UL (ref 4.2–5.4)
SODIUM SERPL-SCNC: 133 MMOL/L (ref 135–145)
WBC # BLD AUTO: 5.2 K/UL (ref 4.8–10.8)

## 2019-12-24 PROCEDURE — 700102 HCHG RX REV CODE 250 W/ 637 OVERRIDE(OP): Performed by: PHYSICIAN ASSISTANT

## 2019-12-24 PROCEDURE — 85025 COMPLETE CBC W/AUTO DIFF WBC: CPT

## 2019-12-24 PROCEDURE — 700112 HCHG RX REV CODE 229: Performed by: PHYSICIAN ASSISTANT

## 2019-12-24 PROCEDURE — A9270 NON-COVERED ITEM OR SERVICE: HCPCS | Performed by: PHYSICIAN ASSISTANT

## 2019-12-24 PROCEDURE — 770001 HCHG ROOM/CARE - MED/SURG/GYN PRIV*

## 2019-12-24 PROCEDURE — 36415 COLL VENOUS BLD VENIPUNCTURE: CPT

## 2019-12-24 PROCEDURE — 82962 GLUCOSE BLOOD TEST: CPT

## 2019-12-24 PROCEDURE — 80048 BASIC METABOLIC PNL TOTAL CA: CPT

## 2019-12-24 RX ADMIN — FAMOTIDINE 10 MG: 20 TABLET, FILM COATED ORAL at 18:06

## 2019-12-24 RX ADMIN — GABAPENTIN 300 MG: 300 CAPSULE ORAL at 18:06

## 2019-12-24 RX ADMIN — ANTACID TABLETS 500 MG: 500 TABLET, CHEWABLE ORAL at 18:06

## 2019-12-24 RX ADMIN — LEVOTHYROXINE SODIUM 50 MCG: 50 TABLET ORAL at 04:25

## 2019-12-24 RX ADMIN — OXYCODONE HYDROCHLORIDE 10 MG: 5 TABLET ORAL at 10:05

## 2019-12-24 RX ADMIN — DOCUSATE SODIUM 100 MG: 100 CAPSULE, LIQUID FILLED ORAL at 04:25

## 2019-12-24 RX ADMIN — ATORVASTATIN CALCIUM 40 MG: 40 TABLET, FILM COATED ORAL at 20:50

## 2019-12-24 RX ADMIN — DOXYCYCLINE 100 MG: 100 TABLET, FILM COATED ORAL at 04:25

## 2019-12-24 RX ADMIN — GABAPENTIN 300 MG: 300 CAPSULE ORAL at 04:25

## 2019-12-24 RX ADMIN — DULOXETINE HYDROCHLORIDE 60 MG: 60 CAPSULE, DELAYED RELEASE ORAL at 04:25

## 2019-12-24 RX ADMIN — DOXYCYCLINE 100 MG: 100 TABLET, FILM COATED ORAL at 18:06

## 2019-12-24 RX ADMIN — LISINOPRIL 20 MG: 20 TABLET ORAL at 18:06

## 2019-12-24 RX ADMIN — OXYCODONE HYDROCHLORIDE 10 MG: 5 TABLET ORAL at 18:06

## 2019-12-24 RX ADMIN — POLYETHYLENE GLYCOL 3350 1 PACKET: 17 POWDER, FOR SOLUTION ORAL at 04:24

## 2019-12-24 RX ADMIN — MAGNESIUM HYDROXIDE 30 ML: 400 SUSPENSION ORAL at 18:05

## 2019-12-24 RX ADMIN — SENNOSIDES AND DOCUSATE SODIUM 1 TABLET: 8.6; 5 TABLET ORAL at 20:50

## 2019-12-24 RX ADMIN — METHOCARBAMOL TABLETS 750 MG: 750 TABLET, COATED ORAL at 20:51

## 2019-12-24 RX ADMIN — FAMOTIDINE 10 MG: 20 TABLET, FILM COATED ORAL at 04:25

## 2019-12-24 RX ADMIN — GABAPENTIN 300 MG: 300 CAPSULE ORAL at 12:27

## 2019-12-24 RX ADMIN — DOCUSATE SODIUM 100 MG: 100 CAPSULE, LIQUID FILLED ORAL at 18:06

## 2019-12-24 RX ADMIN — OMEGA-3 FATTY ACIDS CAP 1000 MG 1000 MG: 1000 CAP at 04:25

## 2019-12-24 RX ADMIN — ANTACID TABLETS 500 MG: 500 TABLET, CHEWABLE ORAL at 04:24

## 2019-12-24 RX ADMIN — METHOCARBAMOL TABLETS 750 MG: 750 TABLET, COATED ORAL at 12:27

## 2019-12-24 RX ADMIN — DULOXETINE HYDROCHLORIDE 60 MG: 60 CAPSULE, DELAYED RELEASE ORAL at 18:06

## 2019-12-24 ASSESSMENT — PATIENT HEALTH QUESTIONNAIRE - PHQ9
1. LITTLE INTEREST OR PLEASURE IN DOING THINGS: NOT AT ALL
2. FEELING DOWN, DEPRESSED, IRRITABLE, OR HOPELESS: NOT AT ALL
SUM OF ALL RESPONSES TO PHQ9 QUESTIONS 1 AND 2: 0
SUM OF ALL RESPONSES TO PHQ9 QUESTIONS 1 AND 2: 0
2. FEELING DOWN, DEPRESSED, IRRITABLE, OR HOPELESS: NOT AT ALL
1. LITTLE INTEREST OR PLEASURE IN DOING THINGS: NOT AT ALL

## 2019-12-24 NOTE — CARE PLAN
Problem: Safety  Goal: Will remain free from falls  Outcome: PROGRESSING AS EXPECTED     Problem: Infection  Goal: Will remain free from infection  Outcome: PROGRESSING AS EXPECTED     Problem: Venous Thromboembolism (VTW)/Deep Vein Thrombosis (DVT) Prevention:  Goal: Patient will participate in Venous Thrombosis (VTE)/Deep Vein Thrombosis (DVT)Prevention Measures  Outcome: PROGRESSING AS EXPECTED     Problem: Knowledge Deficit  Goal: Knowledge of disease process/condition, treatment plan, diagnostic tests, and medications will improve  Outcome: PROGRESSING AS EXPECTED

## 2019-12-24 NOTE — PROGRESS NOTES
Neurosurgery Progress Note    Subjective:  POD#3 L4-S1 Redo Laminectomy with Left L3-S1 and Right L2-S1 Pedicle screw fusion with removal of failed hardware  Doing well with expected back pain.  Denies LE pain  Ambulating well  Drain high at 100cc/8 hours.  Home tomorrow; lives alone in [place]  Cr improved.     Exam:  Wound C/D/I   Strength 5/5   Sensory intact  Ambulating well.    BP  Min: 92/60  Max: 147/56  Pulse  Av.5  Min: 89  Max: 109  Resp  Av  Min: 18  Max: 18  Temp  Av.4 °C (97.6 °F)  Min: 36 °C (96.8 °F)  Max: 36.7 °C (98 °F)  SpO2  Av.8 %  Min: 94 %  Max: 96 %    No data recorded    Recent Labs     19  0426 19  0257 19  0532   WBC 8.1 8.5 5.2   RBC 2.99* 2.87* 2.87*   HEMOGLOBIN 8.9* 8.6* 8.5*   HEMATOCRIT 28.3* 27.0* 27.0*   MCV 94.6 94.1 94.1   MCH 29.8 30.0 29.6   MCHC 31.4* 31.9* 31.5*   RDW 54.4* 54.0* 53.1*   PLATELETCT 185 166 176   MPV 10.4 10.5 10.6     Recent Labs     19  0426 19  0257 19  0532   SODIUM 137 136 133*   POTASSIUM 4.0 4.2 4.3   CHLORIDE 103 100 99   CO2 24 29 30   GLUCOSE 222* 164* 156*   BUN 23* 24* 17   CREATININE 1.49* 1.40 0.86   CALCIUM 7.4* 8.1* 8.5               Intake/Output       19 07 - 19 0659 19 07 - 19 0659       Total  Total       Intake    P.O.  480  -- 480  --  -- --    P.O. 480 -- 480 -- -- --    Total Intake 480 -- 480 -- -- --       Output    Urine  --  -- --  --  -- --    Number of Times Voided 1 x -- 1 x 1 x -- 1 x    Drains  100  145 245  100  -- 100    Output (mL) (Closed/Suction Drain 1 Posterior Back Hemovac) 100 145 245 100 -- 100    Stool  --  -- --  --  -- --    Number of Times Stooled 0 x -- 0 x 0 x -- 0 x    Total Output 100 145 245 100 -- 100       Net I/O     380 -145 235 -100 -- -100            Intake/Output Summary (Last 24 hours) at 2019 1305  Last data filed at 2019 1200  Gross per 24 hour   Intake --   Output 245  ml   Net -245 ml            • insulin regular  2-9 Units 4X/DAY ACHS    And   • glucose  16 g Q15 MIN PRN    And   • dextrose 10% bolus  250 mL Q15 MIN PRN   • atorvastatin  40 mg Nightly   • DULoxetine  60 mg BID   • gabapentin  300 mg TID   • levothyroxine  50 mcg AM ES   • lisinopril  20 mg Q EVENING   • fish oil  1,000 mg DAILY   • traZODone  50 mg Nightly   • Pharmacy Consult Request  1 Each PHARMACY TO DOSE   • docusate sodium  100 mg BID   • senna-docusate  1 Tab Nightly   • senna-docusate  1 Tab Q24HRS PRN   • polyethylene glycol/lytes  1 Packet DAILY   • magnesium hydroxide  30 mL QDAY PRN   • bisacodyl  10 mg Q24HRS PRN   • fleet  1 Each Once PRN   • 0.9 % NaCl with KCl 20 mEq 1,000 mL   Continuous   • diphenhydrAMINE  25 mg Q6HRS PRN    Or   • diphenhydrAMINE  25 mg Q6HRS PRN   • ondansetron  4 mg Q4HRS PRN   • ondansetron  4 mg Q4HRS PRN   • methocarbamol  750 mg 4X/DAY PRN   • ALPRAZolam  0.25 mg TID PRN   • labetalol  10 mg Q HOUR PRN   • benzocaine-menthol  1 Lozenge Q2HRS PRN   • calcium carbonate  500 mg BID   • enoxaparin  40 mg DAILY   • morphine injection  4 mg Q2HRS PRN   • oxyCODONE immediate-release  5 mg Q4HRS PRN   • HYDROcodone/acetaminophen  1 Tab Q4HRS PRN   • oxyCODONE immediate-release  10 mg Q4HRS PRN   • acetaminophen  650 mg Q6HRS PRN   • famotidine  10 mg BID   • doxycycline monohydrate  100 mg BID       Assessment and Plan:  POD#3 L4-S1 Redo Laminectomy with Left L3-S1 and Right L2-S1 Pedicle screw fusion with removal of failed hardware.  Continue current pain meds and drain monitoring; drain to gravity.  May ambulate to BR without brace, but needs it for walks outide room.  Cr. 0.8; hold metformin, encourage fluid intake  Recheck labs in AM tomorrow  Hope to go home tomorrow  Patient set up for home health

## 2019-12-24 NOTE — CARE PLAN
Problem: Urinary Elimination:  Goal: Ability to reestablish a normal urinary elimination pattern will improve  Outcome: PROGRESSING AS EXPECTED  Intervention: Assist patient to sit on commode or toilet for voiding  Note:   No assistance needed.  Voids with frequency.     Problem: Mobility  Goal: Risk for activity intolerance will decrease  Outcome: PROGRESSING AS EXPECTED  Intervention: Provide rest periods  Note:   Pt ambulates ad aniceto in room with steady gait.  Tires easily, but rests when needed.

## 2019-12-24 NOTE — DISCHARGE PLANNING
Anticipated Disposition:  Home with [facility]     Action:   Met with Pt in her room, she reported, she lives alone, but daughter and friends will provide care and ride upon DC. HH is recommended by PT/OT,choice form provided to Pt, [facility] selected and faxed to [facility].   Pt uses [facility] at [place].           Barriers to Discharge:   Medical clearance- drain and oxygen  Home Health order and acceptance.       Plan:  Follow up with attending physician for medical clearance and obtain HH order.             Care Transition Team Assessment    Information Source  Orientation : Oriented x 4  Information Given By: Patient  Informant's Name: Nancy  Who is responsible for making decisions for patient? : Patient         Elopement Risk  Legal Hold: No  Ambulatory or Self Mobile in Wheelchair: Yes  Disoriented: No  Psychiatric Symptoms: None  History of Wandering: No  Elopement this Admit: No  Vocalizing Wanting to Leave: No  Displays Behaviors, Body Language Wanting to Leave: No-Not at Risk for Elopement  Elopement Risk: Not at Risk for Elopement    Interdisciplinary Discharge Planning  Does Admitting Nurse Feel This Could be a Complex Discharge?: No  Primary Care Physician: Madison Lincoln MD   Lives with - Patient's Self Care Capacity: Alone and Able to Care For Self  Patient or legal guardian wants to designate a caregiver (see row info): No  Support Systems: Family Member(s), Children  Housing / Facility: [address]  Do You Take your Prescribed Medications Regularly: Yes  Able to Return to Previous ADL's: Future Time w/Therapy  Mobility Issues: No  Prior Services: Home-Independent  Patient Expects to be Discharged to:: Home  Assistance Needed: Yes  Durable Medical Equipment: Not Applicable    Discharge Preparedness  What is your plan after discharge?: Home health care  What are your discharge supports?: Child  Prior Functional Level: Ambulatory, Independent with Activities of Daily  Living    Functional Assesment  Prior Functional Level: Ambulatory, Independent with Activities of Daily Living    Finances  Financial Barriers to Discharge: No  Prescription Coverage: Yes    Vision / Hearing Impairment  Vision Impairment : Yes  Right Eye Vision: Wears Glasses  Left Eye Vision: Wears Glasses              Domestic Abuse  Have you ever been the victim of abuse or violence?: No  Physical Abuse or Sexual Abuse: No  Verbal Abuse or Emotional Abuse: No  Possible Abuse Reported to:: Not Applicable         Discharge Risks or Barriers  Discharge risks or barriers?: No    Anticipated Discharge Information  Anticipated discharge disposition: Home  Discharge Address: [address] [place] [address] [place]

## 2019-12-25 LAB — GLUCOSE BLD-MCNC: 162 MG/DL (ref 65–99)

## 2019-12-25 PROCEDURE — A9270 NON-COVERED ITEM OR SERVICE: HCPCS | Performed by: PHYSICIAN ASSISTANT

## 2019-12-25 PROCEDURE — 770001 HCHG ROOM/CARE - MED/SURG/GYN PRIV*

## 2019-12-25 PROCEDURE — 700102 HCHG RX REV CODE 250 W/ 637 OVERRIDE(OP): Performed by: PHYSICIAN ASSISTANT

## 2019-12-25 PROCEDURE — 82962 GLUCOSE BLOOD TEST: CPT

## 2019-12-25 PROCEDURE — 700112 HCHG RX REV CODE 229: Performed by: PHYSICIAN ASSISTANT

## 2019-12-25 RX ADMIN — DOXYCYCLINE 100 MG: 100 TABLET, FILM COATED ORAL at 05:19

## 2019-12-25 RX ADMIN — ANTACID TABLETS 500 MG: 500 TABLET, CHEWABLE ORAL at 17:01

## 2019-12-25 RX ADMIN — HYDROCODONE BITARTRATE AND ACETAMINOPHEN 1 TABLET: 10; 325 TABLET ORAL at 09:51

## 2019-12-25 RX ADMIN — INSULIN HUMAN 2 UNITS: 100 INJECTION, SOLUTION PARENTERAL at 17:03

## 2019-12-25 RX ADMIN — FAMOTIDINE 10 MG: 20 TABLET, FILM COATED ORAL at 17:01

## 2019-12-25 RX ADMIN — DULOXETINE HYDROCHLORIDE 60 MG: 60 CAPSULE, DELAYED RELEASE ORAL at 06:00

## 2019-12-25 RX ADMIN — GABAPENTIN 300 MG: 300 CAPSULE ORAL at 17:03

## 2019-12-25 RX ADMIN — HYDROCODONE BITARTRATE AND ACETAMINOPHEN 1 TABLET: 10; 325 TABLET ORAL at 05:19

## 2019-12-25 RX ADMIN — DULOXETINE HYDROCHLORIDE 60 MG: 60 CAPSULE, DELAYED RELEASE ORAL at 17:55

## 2019-12-25 RX ADMIN — OMEGA-3 FATTY ACIDS CAP 1000 MG 1000 MG: 1000 CAP at 05:18

## 2019-12-25 RX ADMIN — GABAPENTIN 300 MG: 300 CAPSULE ORAL at 11:57

## 2019-12-25 RX ADMIN — DOXYCYCLINE 100 MG: 100 TABLET, FILM COATED ORAL at 17:01

## 2019-12-25 RX ADMIN — DOCUSATE SODIUM 100 MG: 100 CAPSULE, LIQUID FILLED ORAL at 17:01

## 2019-12-25 RX ADMIN — HYDROCODONE BITARTRATE AND ACETAMINOPHEN 1 TABLET: 10; 325 TABLET ORAL at 17:02

## 2019-12-25 RX ADMIN — POLYETHYLENE GLYCOL 3350 1 PACKET: 17 POWDER, FOR SOLUTION ORAL at 05:18

## 2019-12-25 RX ADMIN — TRAZODONE HYDROCHLORIDE 50 MG: 50 TABLET ORAL at 20:49

## 2019-12-25 RX ADMIN — GABAPENTIN 300 MG: 300 CAPSULE ORAL at 05:19

## 2019-12-25 RX ADMIN — SENNOSIDES AND DOCUSATE SODIUM 1 TABLET: 8.6; 5 TABLET ORAL at 20:50

## 2019-12-25 RX ADMIN — METHOCARBAMOL TABLETS 750 MG: 750 TABLET, COATED ORAL at 20:50

## 2019-12-25 RX ADMIN — ANTACID TABLETS 500 MG: 500 TABLET, CHEWABLE ORAL at 05:19

## 2019-12-25 RX ADMIN — DOCUSATE SODIUM 100 MG: 100 CAPSULE, LIQUID FILLED ORAL at 05:19

## 2019-12-25 RX ADMIN — FAMOTIDINE 10 MG: 20 TABLET, FILM COATED ORAL at 05:18

## 2019-12-25 RX ADMIN — LEVOTHYROXINE SODIUM 50 MCG: 50 TABLET ORAL at 05:18

## 2019-12-25 ASSESSMENT — PATIENT HEALTH QUESTIONNAIRE - PHQ9
SUM OF ALL RESPONSES TO PHQ9 QUESTIONS 1 AND 2: 0
2. FEELING DOWN, DEPRESSED, IRRITABLE, OR HOPELESS: NOT AT ALL
1. LITTLE INTEREST OR PLEASURE IN DOING THINGS: NOT AT ALL

## 2019-12-25 NOTE — FACE TO FACE
Face to Face Supporting Documentation - Home Health    The encounter with this patient was in whole or in part the primary reason for home health admission.    Date of encounter:   Patient:                    MRN:                       YOB: 2019  WILTON Chowdary  [id number]  1945     Home health to see patient for:  Physical Therapy evaluation and treatment and Occupational therapy evaluation and treatment    Skilled need for:  Surgical Aftercare Lumbar fusion    Skilled nursing interventions to include:  Wound Care    Homebound status evidenced by:  Needs the assistance of another person in order to leave the home. Leaving home requires a considerable and taxing effort. There is a normal inability to leave the home.    Community Physician to provide follow up care: Madison Lincoln M.D.     Optional Interventions? No      I certify the face to face encounter for this home health care referral meets the CMS requirements and the encounter/clinical assessment with the patient was, in whole, or in part, for the medical condition(s) listed above, which is the primary reason for home health care. Based on my clinical findings: the service(s) are medically necessary, support the need for home health care, and the homebound criteria are met.  I certify that this patient has had a face to face encounter by myself.  Clay Carmichael P.A.-C. - NPI: [id number]

## 2019-12-25 NOTE — CARE PLAN
Problem: Safety  Goal: Will remain free from falls  Outcome: PROGRESSING AS EXPECTED     Problem: Infection  Goal: Will remain free from infection  Outcome: PROGRESSING AS EXPECTED     Problem: Knowledge Deficit  Goal: Knowledge of disease process/condition, treatment plan, diagnostic tests, and medications will improve  Outcome: PROGRESSING AS EXPECTED  Goal: Knowledge of the prescribed therapeutic regimen will improve  Outcome: PROGRESSING AS EXPECTED     Problem: Pain Management  Goal: Pain level will decrease to patient's comfort goal  Outcome: PROGRESSING AS EXPECTED     Problem: Urinary Elimination:  Goal: Ability to reestablish a normal urinary elimination pattern will improve  Outcome: PROGRESSING AS EXPECTED     Problem: Mobility  Goal: Risk for activity intolerance will decrease  Outcome: PROGRESSING AS EXPECTED

## 2019-12-25 NOTE — PROGRESS NOTES
Neurosurgery Progress Note    Subjective:  POD#4 L4-S1 Redo Laminectomy with Left L3-S1 and Right L2-S1 Pedicle screw fusion with removal of failed hardware  Doing well with expected back pain.  Denies LE pain  Ambulating well  Drain high at 40cc/8 hours.  Home tomorrow with HH; lives alone in [place]  Cr improved.     Exam:  Wound C/D/I   Strength 5/5   Sensory intact  Ambulating well.    BP  Min: 108/52  Max: 117/45  Pulse  Av  Min: 84  Max: 99  Resp  Av.5  Min: 16  Max: 18  Temp  Av.2 °C (97.2 °F)  Min: 36 °C (96.8 °F)  Max: 36.6 °C (97.9 °F)  SpO2  Av.5 %  Min: 94 %  Max: 98 %    No data recorded    Recent Labs     19  02519  0532   WBC 8.5 5.2   RBC 2.87* 2.87*   HEMOGLOBIN 8.6* 8.5*   HEMATOCRIT 27.0* 27.0*   MCV 94.1 94.1   MCH 30.0 29.6   MCHC 31.9* 31.5*   RDW 54.0* 53.1*   PLATELETCT 166 176   MPV 10.5 10.6     Recent Labs     19  0257 19  0532   SODIUM 136 133*   POTASSIUM 4.2 4.3   CHLORIDE 100 99   CO2 29 30   GLUCOSE 164* 156*   BUN 24* 17   CREATININE 1.40 0.86   CALCIUM 8.1* 8.5               Intake/Output       19 - 19 0659 19 07 - 19 0659       Total  Total       Intake    Total Intake -- -- -- -- -- --       Output    Urine  --  -- --  --  -- --    Number of Times Voided 1 x 3 x 4 x -- -- --    Drains  100  140 240  --  -- --    Output (mL) (Closed/Suction Drain 1 Posterior Back Hemovac) 100 140 240 -- -- --    Stool  --  -- --  --  -- --    Number of Times Stooled 0 x -- 0 x -- -- --    Total Output 100 140 240 -- -- --       Net I/O     -100 -140 -240 -- -- --            Intake/Output Summary (Last 24 hours) at 2019 0947  Last data filed at 2019 0400  Gross per 24 hour   Intake --   Output 240 ml   Net -240 ml            • insulin regular  2-9 Units 4X/DAY ACHS    And   • glucose  16 g Q15 MIN PRN    And   • dextrose 10% bolus  250 mL Q15 MIN PRN   • atorvastatin  40 mg  Nightly   • DULoxetine  60 mg BID   • gabapentin  300 mg TID   • levothyroxine  50 mcg AM ES   • lisinopril  20 mg Q EVENING   • fish oil  1,000 mg DAILY   • traZODone  50 mg Nightly   • Pharmacy Consult Request  1 Each PHARMACY TO DOSE   • docusate sodium  100 mg BID   • senna-docusate  1 Tab Nightly   • senna-docusate  1 Tab Q24HRS PRN   • polyethylene glycol/lytes  1 Packet DAILY   • magnesium hydroxide  30 mL QDAY PRN   • bisacodyl  10 mg Q24HRS PRN   • fleet  1 Each Once PRN   • 0.9 % NaCl with KCl 20 mEq 1,000 mL   Continuous   • diphenhydrAMINE  25 mg Q6HRS PRN    Or   • diphenhydrAMINE  25 mg Q6HRS PRN   • ondansetron  4 mg Q4HRS PRN   • ondansetron  4 mg Q4HRS PRN   • methocarbamol  750 mg 4X/DAY PRN   • ALPRAZolam  0.25 mg TID PRN   • labetalol  10 mg Q HOUR PRN   • benzocaine-menthol  1 Lozenge Q2HRS PRN   • calcium carbonate  500 mg BID   • enoxaparin  40 mg DAILY   • morphine injection  4 mg Q2HRS PRN   • oxyCODONE immediate-release  5 mg Q4HRS PRN   • HYDROcodone/acetaminophen  1 Tab Q4HRS PRN   • oxyCODONE immediate-release  10 mg Q4HRS PRN   • acetaminophen  650 mg Q6HRS PRN   • famotidine  10 mg BID   • doxycycline monohydrate  100 mg BID       Assessment and Plan:  POD#4 L4-S1 Redo Laminectomy with Left L3-S1 and Right L2-S1 Pedicle screw fusion with removal of failed hardware.  Continue current pain meds and drain monitoring; drain to gravity.  May ambulate to BR without brace, but needs it for walks outide room.  Cr. 0.8; hold metformin, encourage fluid intake  Hope to go home tomorrow  Patient set up for home health

## 2019-12-25 NOTE — PROGRESS NOTES
Pt A&Ox4, denies any numbness and tingling, pain is 5/10 (pain med given).    Pt refused bed alarm despite education, pt educated on importance of using the call light when she needs assistance, pt verbalized understanding.

## 2019-12-25 NOTE — PROGRESS NOTES
RN MOBILITY NOTE     Surgery patient?: Yes  Date of surgery: 12/21  Ambulated 50 ft on day of surgery? (N/A if today is not date of surgery): N/A  Number of times ambulated 50 feet or greater today: 4  Patient has been up to chair, edge of bed or HOB 90 degrees for all meals?: Yes  Goal met? (goal is ambulating at least 50 feet 2 times on day shift, one time on night shift): Yes  If patient did not meet mobility goal, why?:

## 2019-12-26 VITALS
BODY MASS INDEX: 35.3 KG/M2 | OXYGEN SATURATION: 93 % | HEIGHT: 64 IN | TEMPERATURE: 98.3 F | RESPIRATION RATE: 16 BRPM | DIASTOLIC BLOOD PRESSURE: 52 MMHG | SYSTOLIC BLOOD PRESSURE: 124 MMHG | HEART RATE: 93 BPM | WEIGHT: 206.79 LBS

## 2019-12-26 LAB — GLUCOSE BLD-MCNC: 192 MG/DL (ref 65–99)

## 2019-12-26 PROCEDURE — A9270 NON-COVERED ITEM OR SERVICE: HCPCS | Performed by: PHYSICIAN ASSISTANT

## 2019-12-26 PROCEDURE — 700112 HCHG RX REV CODE 229: Performed by: PHYSICIAN ASSISTANT

## 2019-12-26 PROCEDURE — 700102 HCHG RX REV CODE 250 W/ 637 OVERRIDE(OP): Performed by: PHYSICIAN ASSISTANT

## 2019-12-26 PROCEDURE — 82962 GLUCOSE BLOOD TEST: CPT

## 2019-12-26 RX ORDER — HYDROCODONE BITARTRATE AND ACETAMINOPHEN 10; 325 MG/1; MG/1
1 TABLET ORAL EVERY 4 HOURS PRN
Qty: 84 TAB | Refills: 0 | Status: SHIPPED | OUTPATIENT
Start: 2019-12-26 | End: 2020-01-09

## 2019-12-26 RX ORDER — METHOCARBAMOL 750 MG/1
750 TABLET, FILM COATED ORAL 4 TIMES DAILY PRN
Qty: 90 TAB | Refills: 0 | Status: SHIPPED | OUTPATIENT
Start: 2019-12-26 | End: 2019-12-26

## 2019-12-26 RX ORDER — METHOCARBAMOL 750 MG/1
750 TABLET, FILM COATED ORAL 3 TIMES DAILY PRN
Qty: 90 TAB | Refills: 0 | Status: SHIPPED | OUTPATIENT
Start: 2019-12-26

## 2019-12-26 RX ADMIN — POLYETHYLENE GLYCOL 3350 1 PACKET: 17 POWDER, FOR SOLUTION ORAL at 06:08

## 2019-12-26 RX ADMIN — OMEGA-3 FATTY ACIDS CAP 1000 MG 1000 MG: 1000 CAP at 06:09

## 2019-12-26 RX ADMIN — FAMOTIDINE 10 MG: 20 TABLET, FILM COATED ORAL at 06:10

## 2019-12-26 RX ADMIN — DULOXETINE HYDROCHLORIDE 60 MG: 60 CAPSULE, DELAYED RELEASE ORAL at 06:10

## 2019-12-26 RX ADMIN — GABAPENTIN 300 MG: 300 CAPSULE ORAL at 06:09

## 2019-12-26 RX ADMIN — LEVOTHYROXINE SODIUM 50 MCG: 50 TABLET ORAL at 06:10

## 2019-12-26 RX ADMIN — ANTACID TABLETS 500 MG: 500 TABLET, CHEWABLE ORAL at 06:10

## 2019-12-26 RX ADMIN — HYDROCODONE BITARTRATE AND ACETAMINOPHEN 1 TABLET: 10; 325 TABLET ORAL at 06:09

## 2019-12-26 RX ADMIN — INSULIN HUMAN 2 UNITS: 100 INJECTION, SOLUTION PARENTERAL at 11:52

## 2019-12-26 RX ADMIN — DOCUSATE SODIUM 100 MG: 100 CAPSULE, LIQUID FILLED ORAL at 06:10

## 2019-12-26 RX ADMIN — DOXYCYCLINE 100 MG: 100 TABLET, FILM COATED ORAL at 06:09

## 2019-12-26 NOTE — PROGRESS NOTES
Discharge instructions reviewed with pt.  IV and drain removed.  Left in stable condition with family.

## 2019-12-26 NOTE — PROGRESS NOTES
Neurosurgery Progress Note    Subjective:  POD#5 L4-S1 Redo Laminectomy with Left L3-S1 and Right L2-S1 Pedicle screw fusion with removal of failed hardware  Doing well with expected back pain.  Denies LE pain  Ambulating well  Home today with HH; lives alone in [place]  Cr improved.     Exam:  Wound C/D/I   Strength 5/5   Sensory intact  Ambulating well.    BP  Min: 107/42  Max: 124/52  Pulse  Av.8  Min: 92  Max: 97  Resp  Avg: 15.8  Min: 15  Max: 16  Temp  Av.4 °C (97.6 °F)  Min: 36.2 °C (97.1 °F)  Max: 36.8 °C (98.3 °F)  SpO2  Av.8 %  Min: 92 %  Max: 93 %    No data recorded    Recent Labs     19  0532   WBC 5.2   RBC 2.87*   HEMOGLOBIN 8.5*   HEMATOCRIT 27.0*   MCV 94.1   MCH 29.6   MCHC 31.5*   RDW 53.1*   PLATELETCT 176   MPV 10.6     Recent Labs     19  0532   SODIUM 133*   POTASSIUM 4.3   CHLORIDE 99   CO2 30   GLUCOSE 156*   BUN 17   CREATININE 0.86   CALCIUM 8.5               Intake/Output       19 0700 - 19 0659 19 07 - 19 0659      2219-7085 5290-4084 Total 0401-7429 5667-8983 Total       Intake    Total Intake -- -- -- -- -- --       Output    Drains  100  120 220  --  -- --    Output (mL) (Closed/Suction Drain 1 Posterior Back Hemovac) 100 120 220 -- -- --    Stool  --  -- --  --  -- --    Number of Times Stooled -- -- -- 0 x -- 0 x    Total Output 100 120 220 -- -- --       Net I/O     -100 -120 -220 -- -- --            Intake/Output Summary (Last 24 hours) at 2019 1002  Last data filed at 2019 0400  Gross per 24 hour   Intake --   Output 220 ml   Net -220 ml            • insulin regular  2-9 Units 4X/DAY ACHS    And   • glucose  16 g Q15 MIN PRN    And   • dextrose 10% bolus  250 mL Q15 MIN PRN   • atorvastatin  40 mg Nightly   • DULoxetine  60 mg BID   • gabapentin  300 mg TID   • levothyroxine  50 mcg AM ES   • lisinopril  20 mg Q EVENING   • fish oil  1,000 mg DAILY   • traZODone  50 mg Nightly   • Pharmacy Consult Request  1 Each  PHARMACY TO DOSE   • docusate sodium  100 mg BID   • senna-docusate  1 Tab Nightly   • senna-docusate  1 Tab Q24HRS PRN   • polyethylene glycol/lytes  1 Packet DAILY   • magnesium hydroxide  30 mL QDAY PRN   • bisacodyl  10 mg Q24HRS PRN   • fleet  1 Each Once PRN   • 0.9 % NaCl with KCl 20 mEq 1,000 mL   Continuous   • diphenhydrAMINE  25 mg Q6HRS PRN    Or   • diphenhydrAMINE  25 mg Q6HRS PRN   • ondansetron  4 mg Q4HRS PRN   • ondansetron  4 mg Q4HRS PRN   • methocarbamol  750 mg 4X/DAY PRN   • ALPRAZolam  0.25 mg TID PRN   • labetalol  10 mg Q HOUR PRN   • benzocaine-menthol  1 Lozenge Q2HRS PRN   • calcium carbonate  500 mg BID   • enoxaparin  40 mg DAILY   • morphine injection  4 mg Q2HRS PRN   • oxyCODONE immediate-release  5 mg Q4HRS PRN   • HYDROcodone/acetaminophen  1 Tab Q4HRS PRN   • oxyCODONE immediate-release  10 mg Q4HRS PRN   • acetaminophen  650 mg Q6HRS PRN   • famotidine  10 mg BID   • doxycycline monohydrate  100 mg BID       Assessment and Plan:  POD#5 L4-S1 Redo Laminectomy with Left L3-S1 and Right L2-S1 Pedicle screw fusion with removal of failed hardware.  May ambulate to BR without brace, but needs it for walks outide room.  D/c drain, ok to discharge home.

## 2019-12-26 NOTE — CARE PLAN
Problem: Safety  Goal: Will remain free from falls  Outcome: PROGRESSING AS EXPECTED     Problem: Infection  Goal: Will remain free from infection  Outcome: PROGRESSING AS EXPECTED     Problem: Venous Thromboembolism (VTW)/Deep Vein Thrombosis (DVT) Prevention:  Goal: Patient will participate in Venous Thrombosis (VTE)/Deep Vein Thrombosis (DVT)Prevention Measures  Outcome: PROGRESSING AS EXPECTED     Problem: Knowledge Deficit  Goal: Knowledge of disease process/condition, treatment plan, diagnostic tests, and medications will improve  Outcome: PROGRESSING AS EXPECTED  Goal: Knowledge of the prescribed therapeutic regimen will improve  Outcome: PROGRESSING AS EXPECTED     Problem: Pain Management  Goal: Pain level will decrease to patient's comfort goal  Outcome: PROGRESSING AS EXPECTED

## 2019-12-26 NOTE — DISCHARGE INSTRUCTIONS
Discharge Instructions    Discharged to home by car with relative. Discharged via wheelchair, hospital escort: Yes.  Special equipment needed: Not Applicable    Be sure to schedule a follow-up appointment with your primary care doctor or any specialists as instructed.     Discharge Plan:     No lifting, pushing, pulling more than 15 pounds   Ice incision site often   Walk often   Keep incision dry for first 4 days postoperatively   No driving on pain medications. Ok to drive after discontinuing narcotics.   Follow up in two and six weeks time.   Call with any questions or concerns.    Diet Plan: Discussed  Activity Level: Discussed  Confirmed Follow up Appointment: Patient to Call and Schedule Appointment  Confirmed Symptoms Management: Discussed  Medication Reconciliation Updated: Yes  Influenza Vaccine Indication: Patient Refuses    I understand that a diet low in cholesterol, fat, and sodium is recommended for good health. Unless I have been given specific instructions below for another diet, I accept this instruction as my diet prescription.   Other diet: Regular    Special Instructions: None    · Is patient discharged on Warfarin / Coumadin?   No     Depression / Suicide Risk    As you are discharged from this [facility] facility, it is important to learn how to keep safe from harming yourself.    Recognize the warning signs:  · Abrupt changes in personality, positive or negative- including increase in energy   · Giving away possessions  · Change in eating patterns- significant weight changes-  positive or negative  · Change in sleeping patterns- unable to sleep or sleeping all the time   · Unwillingness or inability to communicate  · Depression  · Unusual sadness, discouragement and loneliness  · Talk of wanting to die  · Neglect of personal appearance   · Rebelliousness- reckless behavior  · Withdrawal from people/activities they love  · Confusion- inability to concentrate     If you or a loved one  observes any of these behaviors or has concerns about self-harm, here's what you can do:  · Talk about it- your feelings and reasons for harming yourself  · Remove any means that you might use to hurt yourself (examples: pills, rope, extension cords, firearm)  · Get professional help from the community (Mental Health, Substance Abuse, psychological counseling)  · Do not be alone:Call your Safe Contact- someone whom you trust who will be there for you.  · Call your local CRISIS HOTLINE 873-8507 or 319-607-3019  · Call your local Children's Mobile Crisis Response Team [place] (124) 314-0483 or www.[employer]  · Call the toll free National Suicide Prevention Hotlines   · National Suicide Prevention Lifeline 542-488-MFSP (4905)  · National Hope Line Network 800-SUICIDE (707-5373)

## 2019-12-26 NOTE — DISCHARGE PLANNING
Anticipated Discharge Disposition:   Home with [facility]    Action:    [facility]/Dr. Urbina to follow patient per DONAL Carmichael.  [facility] has accepted.  They will fax the forms to [facility] for MD signature.    Pt informed of above.  Daughter will pick patient up.    Bedside RN informed of above.  Barriers to Discharge:    None    Plan:    DC planned today.

## 2019-12-26 NOTE — CARE PLAN
Problem: Knowledge Deficit  Goal: Knowledge of disease process/condition, treatment plan, diagnostic tests, and medications will improve  Note:   Pt update on plan of care

## 2020-01-27 NOTE — DISCHARGE SUMMARY
Discharge Summary    CHIEF COMPLAINT ON ADMISSION  No chief complaint on file.      Reason for Admission  LUMBAR STENOSIS, RADICULOPATHY     Admission Date  12/21/2019    CODE STATUS  Prior    HPI & HOSPITAL COURSE  This is a 74 y.o. female here with L4-S1 redo laminectomy with Left L3-S1 and right L2-S1 Pedicle screw fixation with removal of failed hardware. Surgery went well without issues, and patient was feeling well following the operation. On POD #5 patient feeling well and OK to discharge home with .        Therefore, she is discharged in good and stable condition to home with organized home healthcare and close outpatient follow-up.    The patient met 2-midnight criteria for an inpatient stay at the time of discharge.    Discharge Date  12/26/2019    FOLLOW UP ITEMS POST DISCHARGE  2 weeks with [facility] as scheduled.    DISCHARGE DIAGNOSES  Active Problems:    * No active hospital problems. *  Resolved Problems:    * No resolved hospital problems. *      FOLLOW UP  No future appointments.  Madison Lincoln M.D.  [address] [place] [id number]  [phone]            MEDICATIONS ON DISCHARGE     Medication List      START taking these medications      Instructions   methocarbamol 750 MG Tabs  Commonly known as:  ROBAXIN   Take 1 Tab by mouth 3 times a day as needed (spasm).  Dose:  750 mg        CONTINUE taking these medications      Instructions   DULoxetine 60 MG Cpep delayed-release capsule  Commonly known as:  CYMBALTA   Take 60 mg by mouth 2 times a day.  Dose:  60 mg     fish oil 1000 MG Caps capsule   Take 1,000 mg by mouth every day.  Dose:  1,000 mg     gabapentin 300 MG Caps  Commonly known as:  NEURONTIN   Take 300 mg by mouth 3 times a day.  Dose:  300 mg     levothyroxine 50 MCG Tabs  Commonly known as:  SYNTHROID   Take 50 mcg by mouth Every morning on an empty stomach.  Dose:  50 mcg     LIPITOR 40 MG Tabs  Generic drug:  atorvastatin   Take 40 mg by mouth every  evening.  Dose:  40 mg     lisinopril 20 MG Tabs  Commonly known as:  PRINIVIL   Take 20 mg by mouth every evening.  Dose:  20 mg     metFORMIN 500 MG Tabs  Commonly known as:  GLUCOPHAGE   Take 500 mg by mouth 2 times a day, with meals.  Dose:  500 mg     traZODone 100 MG Tabs  Commonly known as:  DESYREL   Take 100 mg by mouth every evening.  Dose:  100 mg        STOP taking these medications    BIOTIN PO        ASK your doctor about these medications      Instructions   HYDROcodone/acetaminophen  MG Tabs  Commonly known as:  NORCO  Ask about: Should I take this medication?   Take 1 Tab by mouth every four hours as needed for Severe Pain for up to 14 days.  Dose:  1 Tab            Allergies  No Known Allergies    DIET  No orders of the defined types were placed in this encounter.      ACTIVITY  As tolerated and directed by rehab.  Weight bearing as tolerated    CONSULTATIONS  .     PROCEDURES  None.     LABORATORY  Lab Results   Component Value Date    SODIUM 133 (L) 12/24/2019    POTASSIUM 4.3 12/24/2019    CHLORIDE 99 12/24/2019    CO2 30 12/24/2019    GLUCOSE 156 (H) 12/24/2019    BUN 17 12/24/2019    CREATININE 0.86 12/24/2019        Lab Results   Component Value Date    WBC 5.2 12/24/2019    HEMOGLOBIN 8.5 (L) 12/24/2019    HEMATOCRIT 27.0 (L) 12/24/2019    PLATELETCT 176 12/24/2019

## 2022-03-14 PROBLEM — R22.32 MASS OF FINGER, LEFT: Status: ACTIVE | Noted: 2022-03-14

## 2022-06-06 PROBLEM — R22.32 MASS OF FINGER OF LEFT HAND: Status: ACTIVE | Noted: 2022-06-06

## 2022-06-06 PROBLEM — M72.0 DUPUYTREN'S CONTRACTURE OF LEFT HAND: Status: ACTIVE | Noted: 2022-06-06

## 2024-08-24 NOTE — DISCHARGE PLANNING
Anticipated Discharge Disposition:   Home with Home Health Care    Action:   Patient with possible discharge orders for today.  Patient discharging home with home health care if cleared by PT.  Contacted [facility] for update on [facility] acceptance of patient.     Barriers to Discharge:   To be cleared by PT today prior to discharge.  Pending acceptance to [facility].    Plan:   Follow up with [facility] for acceptance to home health care.  Also, watch for PT notes to confirm patient clear to discharge home.  Patient also has recommended DME equipment in room (FWW).       urinary symptoms/vomiting blood

## 2024-11-16 NOTE — PROGRESS NOTES
Neurosurgery Progress Note    Subjective:  Postop day#3 L3-5 XLIF  POD #1 redo L4/5 lami with L3-5 fusion   C/o back pain   No leg pain  Minimal mobility   Left thigh paresthesia improved   Drain working  hemaglobin low   No new symptoms     Pt lives alone remotely and requesting rehab/snf    Exam:  Motor 5/5 bilateral lower extremities  Dressing clean dry and intact      BP  Min: 95/47  Max: 136/70  Pulse  Av.6  Min: 70  Max: 113  Resp  Avg: 15.5  Min: 12  Max: 19  Temp  Av.4 °C (97.5 °F)  Min: 36.2 °C (97.1 °F)  Max: 36.6 °C (97.8 °F)  SpO2  Av.7 %  Min: 90 %  Max: 99 %    No Data Recorded    Recent Labs      19   WBC  11.3*  8.6  7.1   RBC  3.84*  3.70*  2.87*   HEMOGLOBIN  11.5*  11.4*  8.8*   HEMATOCRIT  37.1  35.5*  28.0*   MCV  96.6  95.9  97.6   MCH  29.9  30.8  30.7   MCHC  31.0*  32.1*  31.4*   RDW  47.1  47.6  47.7   PLATELETCT  194  178  145*   MPV  11.0  10.6  10.7     Recent Labs      19   0427   SODIUM  136  133*  133*   POTASSIUM  5.1  4.7  4.3   CHLORIDE  99  99  99   CO2  26  27  28   GLUCOSE  153*  148*  218*   BUN  21  19  16   CREATININE  1.21  1.19  0.99   CALCIUM  8.4*  8.6  8.2*               Intake/Output       19 - 19 - 19 Total  Total       Intake    P.O.  0  -- 0  --  -- --    P.O. 0 -- 0 -- -- --    I.V.  1700  -- 1700  --  -- --    Volume (mL) (lactated ringers infusion) 1700 -- 1700 -- -- --    Total Intake 1700 -- 1700 -- -- --       Output    Urine  --  -- --  --  -- --    Number of Times Voided -- 4 x 4 x -- -- --    Drains  --  220 220  --  -- --    Output (mL) (Closed/Suction Drain 1 Posterior Back) -- 220 220 -- -- --    Blood  150  -- 150  --  -- --    Est. Blood Loss 150 -- 150 -- -- --    Total Output 150 220 370 -- -- --       Net I/O     1550 -220 1330 -- -- --             Intake/Output Summary (Last 24 hours) at 07/05/19 0802  Last data filed at 07/05/19 0400   Gross per 24 hour   Intake             1700 ml   Output              370 ml   Net             1330 ml            • methocarbamol  750 mg 4X/DAY PRN   • ketorolac  15 mg Q6HRS PRN   • Pharmacy Consult Request  1 Each PHARMACY TO DOSE   • NS   Continuous   • enoxaparin  30 mg Q12HRS   • Methocarbamol IVPB  750 mg Q6HRS   • Respiratory Care per Protocol   Continuous RT   • ketorolac  1 Drop 4X/DAY   • NS   Continuous   • atorvastatin  40 mg Nightly   • DULoxetine  60 mg BID   • gabapentin  300 mg TID   • levothyroxine  75 mcg AM ES   • lisinopril  20 mg Q EVENING   • metFORMIN  500 mg BID WITH MEALS   • traZODone  100 mg Nightly   • Pharmacy Consult Request  1 Each PHARMACY TO DOSE   • docusate sodium  100 mg BID   • senna-docusate  1 Tab Nightly   • senna-docusate  1 Tab Q24HRS PRN   • polyethylene glycol/lytes  1 Packet BID PRN   • magnesium hydroxide  30 mL QDAY PRN   • bisacodyl  10 mg Q24HRS PRN   • fleet  1 Each Once PRN   • acetaminophen  650 mg Q6HRS   • oxyCODONE immediate-release  5 mg Q3HRS PRN   • oxyCODONE immediate release  10 mg Q3HRS PRN   • HYDROmorphone  0.5-1 mg Q3HRS PRN   • diphenhydrAMINE  25 mg Q6HRS PRN    Or   • diphenhydrAMINE  25 mg Q6HRS PRN   • ondansetron  4 mg Q4HRS PRN   • ondansetron  4 mg Q4HRS PRN   • ALPRAZolam  0.25 mg BID PRN   • labetalol  10 mg Q HOUR PRN   • hydrALAZINE  10 mg Q HOUR PRN   • benzocaine-menthol  1 Lozenge Q2HRS PRN   • calcium carbonate  500 mg BID   • insulin regular  2-9 Units 4X/DAY ACHS    And   • glucose  16 g Q15 MIN PRN    And   • dextrose 10% bolus  250 mL Q15 MIN PRN   • artificial tears  1 Application Q2HRS PRN       Assessment and Plan:  POD #3/1  PT/OT  Mobilize   Watch drain   CBC for morning  snf consult    No

## (undated) DEVICE — SUTURE 2-0 VICRYL PLUS CT-1 - 8 X 18 INCH(12/BX)

## (undated) DEVICE — KIT ANESTHESIA W/CIRCUIT & 3/LT BAG W/FILTER (20EA/CA)

## (undated) DEVICE — MIDAS LUBRICATOR DIFFUSER PACK (4EA/CA)

## (undated) DEVICE — SENSOR SPO2 NEO LNCS ADHESIVE (20/BX) SEE USER NOTES

## (undated) DEVICE — SPONGE RADIOPAQUE CTN X-LG - STERILE (50PK/CA) MADE TO ORDER ITEM AND HAS A 4-6 WEEK LEAD TIME

## (undated) DEVICE — ELECTRODE 850 FOAM ADHESIVE - HYDROGEL RADIOTRNSPRNT (50/PK)

## (undated) DEVICE — SUCTION INSTRUMENT YANKAUER BULBOUS TIP W/O VENT (50EA/CA)

## (undated) DEVICE — PACK NEURO - (2EA/CA)

## (undated) DEVICE — DRAPE MAYO STAND - (30/CA)

## (undated) DEVICE — GOWN WARMING STANDARD FLEX - (30/CA)

## (undated) DEVICE — SUTURE 1 VICRYL PLUS CT-1 - 18 INCH (12/BX)

## (undated) DEVICE — GOWN SURGEONS X-LARGE - DISP. (30/CA)

## (undated) DEVICE — GLOVE BIOGEL PI INDICATOR SZ 6.5 SURGICAL PF LF - (50/BX 4BX/CA)

## (undated) DEVICE — LACTATED RINGERS INJ 1000 ML - (14EA/CA 60CA/PF)

## (undated) DEVICE — DRAPE STRLE REG TOWEL 18X24 - (10/BX 4BX/CA)"

## (undated) DEVICE — SUCTION TIP STRAIGHT ARGYLE - 50EA/CA

## (undated) DEVICE — FIXATION PINS

## (undated) DEVICE — RESERVOIR SUCTION 100 CC - SILICONE (20EA/CA)

## (undated) DEVICE — CLIP MED LG INTNL HRZN TI ESCP - (20/BX)

## (undated) DEVICE — LACTATED RINGERS INJ. 500 ML - (24EA/CA)

## (undated) DEVICE — SET EXTENSION WITH 2 PORTS (48EA/CA) ***PART #2C8610 IS A SUBSTITUTE*****

## (undated) DEVICE — LIGHT SOURCE MIS 12FT

## (undated) DEVICE — GLOVE BIOGEL PI INDICATOR SZ 7.0 SURGICAL PF LF - (50/BX 4BX/CA)

## (undated) DEVICE — MASK ANESTHESIA ADULT  - (100/CA)

## (undated) DEVICE — CLIP MED INTNL HRZN TI ESCP - (25/BX)

## (undated) DEVICE — GLOVE SZ 6.5 BIOGEL PI MICRO - PF LF (50PR/BX)

## (undated) DEVICE — KIT SURGIFLO W/OUT THROMBIN - (6EA/CA)

## (undated) DEVICE — SODIUM CHL IRRIGATION 0.9% 1000ML (12EA/CA)

## (undated) DEVICE — SYRINGE ASEPTO - (50EA/CA

## (undated) DEVICE — GLOVE BIOGEL INDICATOR SZ 6.5 SURGICAL PF LTX - (50PR/BX 4BX/CA)

## (undated) DEVICE — SET LEADWIRE 5 LEAD BEDSIDE DISPOSABLE ECG (1SET OF 5/EA)

## (undated) DEVICE — GLOVE BIOGEL SZ 6.5 SURGICAL PF LTX (50PR/BX 4BX/CA)

## (undated) DEVICE — INTRAOP NEURO IN OR 1:1 PER 15 MIN

## (undated) DEVICE — CATHETER EPIDURAL PORTEX (10/BX) ***DISCONTINUED LOOKING INTO SUB***

## (undated) DEVICE — TUBING C&T SET FLYING LEADS DRAIN TUBING (10EA/BX)

## (undated) DEVICE — TRAY CATHETER FOLEY URINE METER W/STATLOCK 350ML (10EA/CA)

## (undated) DEVICE — TUBE CONNECT SUCTION CLEAR 120 X 1/4" (50EA/CA)"

## (undated) DEVICE — DRESSING XEROFORM 1X8 - (50/BX 4BX/CA)

## (undated) DEVICE — HEADREST PRONEVIEW LARGE - (10/CA)

## (undated) DEVICE — DRAPE C ARMOR (12EA/CA)

## (undated) DEVICE — KIT ROOM DECONTAMINATION

## (undated) DEVICE — SET EPIDURAL BURRON NON-SAFETY (12EA/CA)

## (undated) DEVICE — TOOL DISSECT MATCH HEAD

## (undated) DEVICE — KIT EVACUATER 3 SPRING PVC LF 1/8 DRAIN SIZE (10EA/CA)"

## (undated) DEVICE — SUTURE GENERAL

## (undated) DEVICE — SUTURE 3-0 ETHILON FSLX 30 (36PK/BX)"

## (undated) DEVICE — GLOVE BIOGEL ECLIPSE PF LATEX SIZE 8.5 (50PR/BX)

## (undated) DEVICE — GLOVE PROTEXIS W/NEU-THERA SZ 8.5 (50PR/BX)

## (undated) DEVICE — SHIM

## (undated) DEVICE — DRAPE SURG STERI-DRAPE 7X11OD - (40EA/CA)

## (undated) DEVICE — NEPTUNE 4 PORT MANIFOLD - (20/PK)

## (undated) DEVICE — PACK JACKSON TABLE KIT W/OUT - HR (6EA/CA)

## (undated) DEVICE — PAD LAP STERILE 18 X 18 - (5/PK 40PK/CA)

## (undated) DEVICE — SYRINGE 30 ML LL (56/BX)

## (undated) DEVICE — ARMREST CRADLE FOAM - (2PR/PK 12PR/CA)

## (undated) DEVICE — SLEEVE, VASO, THIGH, MED

## (undated) DEVICE — DRAPE SRG LG BCK TBL DISP - 10/CA

## (undated) DEVICE — TOWELS CLOTH SURGICAL - (4/PK 20PK/CA)

## (undated) DEVICE — CLOSURE SKIN STRIP 1/2 X 4 IN - (STERI STRIP) (50/BX 4BX/CA)

## (undated) DEVICE — CANISTER SUCTION 3000ML MECHANICAL FILTER AUTO SHUTOFF MEDI-VAC NONSTERILE LF DISP  (40EA/CA)

## (undated) DEVICE — DRAPE C-ARM LARGE 41IN X 74 IN - (10/BX 2BX/CA)

## (undated) DEVICE — SOD. CHL. INJ. 0.9% 1000 ML - (14EA/CA 60CA/PF)

## (undated) DEVICE — CHLORAPREP 26 ML APPLICATOR - ORANGE TINT(25/CA)

## (undated) DEVICE — SPONGE KITTNER DISSECTORS - (5EA/PK 50PK/CA)

## (undated) DEVICE — PROTECTOR ULNA NERVE - (36PR/CA)

## (undated) DEVICE — SPONGE GAUZESTER 4 X 4 4PLY - (128PK/CA)

## (undated) DEVICE — DRAPE LARGE 3 QUARTER - (20/CA)

## (undated) DEVICE — GLOVE SZ 8 BIOGEL PI MICRO - PF LF (50PR/BX)

## (undated) DEVICE — GLOVE BIOGEL PI INDICATOR SZ 8.0 SURGICAL PF LF -(50/BX 4BX/CA)

## (undated) DEVICE — K-WIRE

## (undated) DEVICE — SUTURE 0 SILK TIES (36PK/BX)

## (undated) DEVICE — SYRINGE DISP. 60 CC LL - (30/BX, 12BX/CA)**WHEN THESE ARE GONE ORDER #500206**

## (undated) DEVICE — GLOVE BIOGEL INDICATOR SZ 8 SURGICAL PF LTX - (50/BX 4BX/CA)

## (undated) DEVICE — DRAIN JACKSON PRATT 10FR - (10/CS)

## (undated) DEVICE — GLOVE BIOGEL ECLIPSE PF LATEX SIZE 8.0  (50PR/BX)

## (undated) DEVICE — PEDIGUARD CURVED (1EA)

## (undated) DEVICE — ELECTRODE DUAL RETURN W/ CORD - (50/PK)

## (undated) DEVICE — KIT GEL-FLOW NT ABORBABLE GELATIN (6EA/BX)

## (undated) DEVICE — DILATOR KIT

## (undated) DEVICE — GLOVE BIOGEL SZ 7 SURGICAL PF LTX - (50PR/BX 4BX/CA)

## (undated) DEVICE — NEEDLE NON-SAFETY HYPO 21 GA X 1 1/2 IN HYPO (100/BX)

## (undated) DEVICE — BOVIE  BLADE 6 EXTENDED - (50/PK)

## (undated) DEVICE — STERI STRIP COMPOUND BENZOIN - TINCTURE 0.6ML WITH APPLICATOR (40EA/BX)

## (undated) DEVICE — DRAPE LAPAROTOMY T SHEET - (12EA/CA)

## (undated) DEVICE — DRAPE MICROSCOPE X-LONG (10EA/CA)

## (undated) DEVICE — TUBING CLEARLINK DUO-VENT - C-FLO (48EA/CA)

## (undated) DEVICE — FIBRILLAR SURGICEL 4X4 - 10/CA

## (undated) DEVICE — SYRINGE SAFETY 10 ML 18 GA X 1 1/2 BLUNT LL (100/BX 4BX/CA)

## (undated) DEVICE — CLIP LG INTNL HRZN TI ESCP LGT - (20/BX)